# Patient Record
Sex: MALE | Race: WHITE | NOT HISPANIC OR LATINO | Employment: OTHER | URBAN - METROPOLITAN AREA
[De-identification: names, ages, dates, MRNs, and addresses within clinical notes are randomized per-mention and may not be internally consistent; named-entity substitution may affect disease eponyms.]

---

## 2019-01-01 ENCOUNTER — APPOINTMENT (OUTPATIENT)
Dept: RADIOLOGY | Facility: MEDICAL CENTER | Age: 79
DRG: 283 | End: 2019-01-01
Attending: STUDENT IN AN ORGANIZED HEALTH CARE EDUCATION/TRAINING PROGRAM
Payer: MEDICARE

## 2019-01-01 ENCOUNTER — HOSPITAL ENCOUNTER (INPATIENT)
Facility: MEDICAL CENTER | Age: 79
LOS: 1 days | DRG: 283 | End: 2019-01-03
Attending: EMERGENCY MEDICINE | Admitting: INTERNAL MEDICINE
Payer: MEDICARE

## 2019-01-01 VITALS
SYSTOLIC BLOOD PRESSURE: 118 MMHG | HEIGHT: 69 IN | WEIGHT: 180.78 LBS | BODY MASS INDEX: 26.78 KG/M2 | DIASTOLIC BLOOD PRESSURE: 67 MMHG | TEMPERATURE: 99.5 F

## 2019-01-01 DIAGNOSIS — I21.4 NSTEMI (NON-ST ELEVATED MYOCARDIAL INFARCTION) (HCC): ICD-10-CM

## 2019-01-01 DIAGNOSIS — N18.9 ACUTE KIDNEY INJURY SUPERIMPOSED ON CHRONIC KIDNEY DISEASE (HCC): ICD-10-CM

## 2019-01-01 DIAGNOSIS — N17.9 ACUTE KIDNEY INJURY SUPERIMPOSED ON CHRONIC KIDNEY DISEASE (HCC): ICD-10-CM

## 2019-01-01 DIAGNOSIS — D64.9 ANEMIA, UNSPECIFIED TYPE: ICD-10-CM

## 2019-01-01 LAB
ABO GROUP BLD: NORMAL
ABO GROUP BLD: NORMAL
ACTION RANGE TRIGGERED IACRT: YES
ALBUMIN SERPL BCP-MCNC: 3.6 G/DL (ref 3.2–4.9)
ALBUMIN/GLOB SERPL: 1.3 G/DL
ALP SERPL-CCNC: 102 U/L (ref 30–99)
ALT SERPL-CCNC: 24 U/L (ref 2–50)
ANION GAP SERPL CALC-SCNC: 5 MMOL/L (ref 0–11.9)
AST SERPL-CCNC: 33 U/L (ref 12–45)
BARCODED ABORH UBTYP: 6200
BARCODED PRD CODE UBPRD: NORMAL
BARCODED UNIT NUM UBUNT: NORMAL
BASE EXCESS BLDA CALC-SCNC: -2 MMOL/L (ref -4–3)
BASOPHILS # BLD AUTO: 0 % (ref 0–1.8)
BASOPHILS # BLD: 0 K/UL (ref 0–0.12)
BILIRUB SERPL-MCNC: 0.3 MG/DL (ref 0.1–1.5)
BLD GP AB SCN SERPL QL: NORMAL
BNP SERPL-MCNC: 911 PG/ML (ref 0–100)
BODY TEMPERATURE: ABNORMAL DEGREES
BUN SERPL-MCNC: 47 MG/DL (ref 8–22)
CALCIUM SERPL-MCNC: 8.3 MG/DL (ref 8.5–10.5)
CHLORIDE SERPL-SCNC: 105 MMOL/L (ref 96–112)
CO2 BLDA-SCNC: 29 MMOL/L (ref 20–33)
CO2 SERPL-SCNC: 28 MMOL/L (ref 20–33)
COMPONENT R 8504R: NORMAL
CREAT SERPL-MCNC: 3.92 MG/DL (ref 0.5–1.4)
EKG IMPRESSION: NORMAL
EOSINOPHIL # BLD AUTO: 0.2 K/UL (ref 0–0.51)
EOSINOPHIL NFR BLD: 0.9 % (ref 0–6.9)
ERYTHROCYTE [DISTWIDTH] IN BLOOD BY AUTOMATED COUNT: 47.2 FL (ref 35.9–50)
ERYTHROCYTE [DISTWIDTH] IN BLOOD BY AUTOMATED COUNT: 50.8 FL (ref 35.9–50)
FERRITIN SERPL-MCNC: 429.7 NG/ML (ref 22–322)
GGT SERPL-CCNC: 23 U/L (ref 7–51)
GLOBULIN SER CALC-MCNC: 2.7 G/DL (ref 1.9–3.5)
GLUCOSE SERPL-MCNC: 223 MG/DL (ref 65–99)
HCO3 BLDA-SCNC: 27 MMOL/L (ref 17–25)
HCT VFR BLD AUTO: 27.1 % (ref 42–52)
HCT VFR BLD AUTO: 31.4 % (ref 42–52)
HGB BLD-MCNC: 8.3 G/DL (ref 14–18)
HGB BLD-MCNC: 9.5 G/DL (ref 14–18)
HOROWITZ INDEX BLDA+IHG-RTO: 140 MM[HG]
INST. QUALIFIED PATIENT IIQPT: YES
IRON SATN MFR SERPL: 21 % (ref 15–55)
IRON SERPL-MCNC: 69 UG/DL (ref 50–180)
LACTATE BLD-SCNC: 1.1 MMOL/L (ref 0.5–2)
LYMPHOCYTES # BLD AUTO: 1.17 K/UL (ref 1–4.8)
LYMPHOCYTES NFR BLD: 5.2 % (ref 22–41)
MAGNESIUM SERPL-MCNC: 2.2 MG/DL (ref 1.5–2.5)
MANUAL DIFF BLD: NORMAL
MCH RBC QN AUTO: 30.9 PG (ref 27–33)
MCH RBC QN AUTO: 31 PG (ref 27–33)
MCHC RBC AUTO-ENTMCNC: 30.3 G/DL (ref 33.7–35.3)
MCHC RBC AUTO-ENTMCNC: 30.6 G/DL (ref 33.7–35.3)
MCV RBC AUTO: 100.7 FL (ref 81.4–97.8)
MCV RBC AUTO: 102.6 FL (ref 81.4–97.8)
METAMYELOCYTES NFR BLD MANUAL: 4.4 %
MONOCYTES # BLD AUTO: 0.79 K/UL (ref 0–0.85)
MONOCYTES NFR BLD AUTO: 3.5 % (ref 0–13.4)
MORPHOLOGY BLD-IMP: NORMAL
MYELOCYTES NFR BLD MANUAL: 5.3 %
NEUTROPHILS # BLD AUTO: 18.16 K/UL (ref 1.82–7.42)
NEUTROPHILS NFR BLD: 77.2 % (ref 44–72)
NEUTS BAND NFR BLD MANUAL: 3.5 % (ref 0–10)
NRBC # BLD AUTO: 0.42 K/UL
NRBC BLD-RTO: 1.9 /100 WBC
O2/TOTAL GAS SETTING VFR VENT: 60 %
OVALOCYTES BLD QL SMEAR: NORMAL
PCO2 BLDA: 68.3 MMHG (ref 26–37)
PCO2 TEMP ADJ BLDA: 68.9 MMHG (ref 26–37)
PH BLDA: 7.2 [PH] (ref 7.4–7.5)
PH TEMP ADJ BLDA: 7.2 [PH] (ref 7.4–7.5)
PHOSPHATE SERPL-MCNC: 4.6 MG/DL (ref 2.5–4.5)
PLATELET # BLD AUTO: 136 K/UL (ref 164–446)
PLATELET # BLD AUTO: 155 K/UL (ref 164–446)
PLATELET BLD QL SMEAR: NORMAL
PMV BLD AUTO: 10.8 FL (ref 9–12.9)
PMV BLD AUTO: 10.9 FL (ref 9–12.9)
PO2 BLDA: 84 MMHG (ref 64–87)
PO2 TEMP ADJ BLDA: 86 MMHG (ref 64–87)
POIKILOCYTOSIS BLD QL SMEAR: NORMAL
POTASSIUM SERPL-SCNC: 5.4 MMOL/L (ref 3.6–5.5)
PROCALCITONIN SERPL-MCNC: 0.34 NG/ML
PRODUCT TYPE UPROD: NORMAL
PROT SERPL-MCNC: 6.3 G/DL (ref 6–8.2)
RBC # BLD AUTO: 2.69 M/UL (ref 4.7–6.1)
RBC # BLD AUTO: 3.06 M/UL (ref 4.7–6.1)
RBC BLD AUTO: PRESENT
RH BLD: NORMAL
RH BLD: NORMAL
SAO2 % BLDA: 93 % (ref 93–99)
SODIUM SERPL-SCNC: 138 MMOL/L (ref 135–145)
SPECIMEN DRAWN FROM PATIENT: ABNORMAL
TIBC SERPL-MCNC: 326 UG/DL (ref 250–450)
TOXIC GRANULES BLD QL SMEAR: SLIGHT
TROPONIN I SERPL-MCNC: 15.46 NG/ML (ref 0–0.04)
TSH SERPL DL<=0.005 MIU/L-ACNC: 1.48 UIU/ML (ref 0.38–5.33)
UNIT STATUS USTAT: NORMAL
WBC # BLD AUTO: 18.5 K/UL (ref 4.8–10.8)
WBC # BLD AUTO: 22.5 K/UL (ref 4.8–10.8)

## 2019-01-01 PROCEDURE — 93005 ELECTROCARDIOGRAM TRACING: CPT | Performed by: STUDENT IN AN ORGANIZED HEALTH CARE EDUCATION/TRAINING PROGRAM

## 2019-01-01 PROCEDURE — 76700 US EXAM ABDOM COMPLETE: CPT

## 2019-01-01 PROCEDURE — A9270 NON-COVERED ITEM OR SERVICE: HCPCS | Performed by: STUDENT IN AN ORGANIZED HEALTH CARE EDUCATION/TRAINING PROGRAM

## 2019-01-01 PROCEDURE — 30233N1 TRANSFUSION OF NONAUTOLOGOUS RED BLOOD CELLS INTO PERIPHERAL VEIN, PERCUTANEOUS APPROACH: ICD-10-PCS | Performed by: INTERNAL MEDICINE

## 2019-01-01 PROCEDURE — 82728 ASSAY OF FERRITIN: CPT

## 2019-01-01 PROCEDURE — 99291 CRITICAL CARE FIRST HOUR: CPT | Performed by: INTERNAL MEDICINE

## 2019-01-01 PROCEDURE — 36430 TRANSFUSION BLD/BLD COMPNT: CPT

## 2019-01-01 PROCEDURE — 700111 HCHG RX REV CODE 636 W/ 250 OVERRIDE (IP): Performed by: INTERNAL MEDICINE

## 2019-01-01 PROCEDURE — 99233 SBSQ HOSP IP/OBS HIGH 50: CPT | Performed by: INTERNAL MEDICINE

## 2019-01-01 PROCEDURE — 96374 THER/PROPH/DIAG INJ IV PUSH: CPT

## 2019-01-01 PROCEDURE — 85027 COMPLETE CBC AUTOMATED: CPT

## 2019-01-01 PROCEDURE — 84484 ASSAY OF TROPONIN QUANT: CPT

## 2019-01-01 PROCEDURE — 86923 COMPATIBILITY TEST ELECTRIC: CPT

## 2019-01-01 PROCEDURE — 5A09357 ASSISTANCE WITH RESPIRATORY VENTILATION, LESS THAN 24 CONSECUTIVE HOURS, CONTINUOUS POSITIVE AIRWAY PRESSURE: ICD-10-PCS | Performed by: INTERNAL MEDICINE

## 2019-01-01 PROCEDURE — 94002 VENT MGMT INPAT INIT DAY: CPT

## 2019-01-01 PROCEDURE — 82977 ASSAY OF GGT: CPT

## 2019-01-01 PROCEDURE — 99292 CRITICAL CARE ADDL 30 MIN: CPT | Performed by: INTERNAL MEDICINE

## 2019-01-01 PROCEDURE — 83735 ASSAY OF MAGNESIUM: CPT

## 2019-01-01 PROCEDURE — 83036 HEMOGLOBIN GLYCOSYLATED A1C: CPT

## 2019-01-01 PROCEDURE — 93010 ELECTROCARDIOGRAM REPORT: CPT | Mod: 76 | Performed by: INTERNAL MEDICINE

## 2019-01-01 PROCEDURE — 93010 ELECTROCARDIOGRAM REPORT: CPT | Performed by: INTERNAL MEDICINE

## 2019-01-01 PROCEDURE — 82803 BLOOD GASES ANY COMBINATION: CPT

## 2019-01-01 PROCEDURE — 700102 HCHG RX REV CODE 250 W/ 637 OVERRIDE(OP): Performed by: STUDENT IN AN ORGANIZED HEALTH CARE EDUCATION/TRAINING PROGRAM

## 2019-01-01 PROCEDURE — 83540 ASSAY OF IRON: CPT

## 2019-01-01 PROCEDURE — 51798 US URINE CAPACITY MEASURE: CPT

## 2019-01-01 PROCEDURE — 700105 HCHG RX REV CODE 258: Performed by: EMERGENCY MEDICINE

## 2019-01-01 PROCEDURE — 84443 ASSAY THYROID STIM HORMONE: CPT

## 2019-01-01 PROCEDURE — 700105 HCHG RX REV CODE 258: Performed by: STUDENT IN AN ORGANIZED HEALTH CARE EDUCATION/TRAINING PROGRAM

## 2019-01-01 PROCEDURE — 94760 N-INVAS EAR/PLS OXIMETRY 1: CPT

## 2019-01-01 PROCEDURE — 700111 HCHG RX REV CODE 636 W/ 250 OVERRIDE (IP): Performed by: STUDENT IN AN ORGANIZED HEALTH CARE EDUCATION/TRAINING PROGRAM

## 2019-01-01 PROCEDURE — 84100 ASSAY OF PHOSPHORUS: CPT

## 2019-01-01 PROCEDURE — 80053 COMPREHEN METABOLIC PANEL: CPT

## 2019-01-01 PROCEDURE — 86850 RBC ANTIBODY SCREEN: CPT

## 2019-01-01 PROCEDURE — 700111 HCHG RX REV CODE 636 W/ 250 OVERRIDE (IP): Performed by: EMERGENCY MEDICINE

## 2019-01-01 PROCEDURE — 83605 ASSAY OF LACTIC ACID: CPT

## 2019-01-01 PROCEDURE — C9113 INJ PANTOPRAZOLE SODIUM, VIA: HCPCS | Performed by: EMERGENCY MEDICINE

## 2019-01-01 PROCEDURE — 83550 IRON BINDING TEST: CPT

## 2019-01-01 PROCEDURE — C9113 INJ PANTOPRAZOLE SODIUM, VIA: HCPCS | Performed by: INTERNAL MEDICINE

## 2019-01-01 PROCEDURE — 700101 HCHG RX REV CODE 250: Performed by: STUDENT IN AN ORGANIZED HEALTH CARE EDUCATION/TRAINING PROGRAM

## 2019-01-01 PROCEDURE — 93005 ELECTROCARDIOGRAM TRACING: CPT | Performed by: EMERGENCY MEDICINE

## 2019-01-01 PROCEDURE — 86900 BLOOD TYPING SEROLOGIC ABO: CPT

## 2019-01-01 PROCEDURE — P9016 RBC LEUKOCYTES REDUCED: HCPCS

## 2019-01-01 PROCEDURE — 99285 EMERGENCY DEPT VISIT HI MDM: CPT

## 2019-01-01 PROCEDURE — 770020 HCHG ROOM/CARE - TELE (206)

## 2019-01-01 PROCEDURE — 85007 BL SMEAR W/DIFF WBC COUNT: CPT

## 2019-01-01 PROCEDURE — 86901 BLOOD TYPING SEROLOGIC RH(D): CPT

## 2019-01-01 PROCEDURE — 84145 PROCALCITONIN (PCT): CPT

## 2019-01-01 PROCEDURE — 87040 BLOOD CULTURE FOR BACTERIA: CPT

## 2019-01-01 PROCEDURE — 83880 ASSAY OF NATRIURETIC PEPTIDE: CPT

## 2019-01-01 PROCEDURE — 99223 1ST HOSP IP/OBS HIGH 75: CPT | Performed by: INTERNAL MEDICINE

## 2019-01-01 PROCEDURE — 71045 X-RAY EXAM CHEST 1 VIEW: CPT

## 2019-01-01 RX ORDER — ONDANSETRON 2 MG/ML
8 INJECTION INTRAMUSCULAR; INTRAVENOUS EVERY 8 HOURS PRN
Status: DISCONTINUED | OUTPATIENT
Start: 2019-01-01 | End: 2019-01-01 | Stop reason: HOSPADM

## 2019-01-01 RX ORDER — IPRATROPIUM BROMIDE AND ALBUTEROL SULFATE 2.5; .5 MG/3ML; MG/3ML
SOLUTION RESPIRATORY (INHALATION)
Status: DISCONTINUED
Start: 2019-01-01 | End: 2019-01-01

## 2019-01-01 RX ORDER — ATORVASTATIN CALCIUM 10 MG/1
10 TABLET, FILM COATED ORAL EVERY EVENING
Status: DISCONTINUED | OUTPATIENT
Start: 2019-01-01 | End: 2019-01-01

## 2019-01-01 RX ORDER — MORPHINE SULFATE 10 MG/ML
10 INJECTION, SOLUTION INTRAMUSCULAR; INTRAVENOUS
Status: DISCONTINUED | OUTPATIENT
Start: 2019-01-01 | End: 2019-01-01 | Stop reason: HOSPADM

## 2019-01-01 RX ORDER — MORPHINE SULFATE 10 MG/ML
5 INJECTION, SOLUTION INTRAMUSCULAR; INTRAVENOUS
Status: DISCONTINUED | OUTPATIENT
Start: 2019-01-01 | End: 2019-01-01 | Stop reason: HOSPADM

## 2019-01-01 RX ORDER — FUROSEMIDE 10 MG/ML
80 INJECTION INTRAMUSCULAR; INTRAVENOUS ONCE
Status: DISCONTINUED | OUTPATIENT
Start: 2019-01-01 | End: 2019-01-01

## 2019-01-01 RX ORDER — MORPHINE SULFATE 4 MG/ML
1 INJECTION, SOLUTION INTRAMUSCULAR; INTRAVENOUS EVERY 4 HOURS PRN
Status: DISCONTINUED | OUTPATIENT
Start: 2019-01-01 | End: 2019-01-01

## 2019-01-01 RX ORDER — MORPHINE SULFATE 100 MG/5ML
10 SOLUTION ORAL
Status: DISCONTINUED | OUTPATIENT
Start: 2019-01-01 | End: 2019-01-01 | Stop reason: HOSPADM

## 2019-01-01 RX ORDER — MORPHINE SULFATE 100 MG/5ML
10 SOLUTION ORAL
Status: DISCONTINUED | OUTPATIENT
Start: 2019-01-01 | End: 2019-01-01

## 2019-01-01 RX ORDER — ONDANSETRON 4 MG/1
8 TABLET, ORALLY DISINTEGRATING ORAL EVERY 8 HOURS PRN
Status: DISCONTINUED | OUTPATIENT
Start: 2019-01-01 | End: 2019-01-01 | Stop reason: HOSPADM

## 2019-01-01 RX ORDER — AMOXICILLIN 250 MG
2 CAPSULE ORAL 2 TIMES DAILY
Status: DISCONTINUED | OUTPATIENT
Start: 2019-01-01 | End: 2019-01-01

## 2019-01-01 RX ORDER — IPRATROPIUM BROMIDE AND ALBUTEROL SULFATE 2.5; .5 MG/3ML; MG/3ML
3 SOLUTION RESPIRATORY (INHALATION)
Status: DISCONTINUED | OUTPATIENT
Start: 2019-01-01 | End: 2019-01-01

## 2019-01-01 RX ORDER — LORAZEPAM 2 MG/ML
1 CONCENTRATE ORAL
Status: DISCONTINUED | OUTPATIENT
Start: 2019-01-01 | End: 2019-01-01 | Stop reason: HOSPADM

## 2019-01-01 RX ORDER — POLYETHYLENE GLYCOL 3350 17 G/17G
1 POWDER, FOR SOLUTION ORAL
Status: DISCONTINUED | OUTPATIENT
Start: 2019-01-01 | End: 2019-01-01

## 2019-01-01 RX ORDER — LORAZEPAM 2 MG/ML
1 CONCENTRATE ORAL
Status: DISCONTINUED | OUTPATIENT
Start: 2019-01-01 | End: 2019-01-01

## 2019-01-01 RX ORDER — NITROGLYCERIN 20 MG/100ML
5 INJECTION INTRAVENOUS CONTINUOUS
Status: DISCONTINUED | OUTPATIENT
Start: 2019-01-01 | End: 2019-01-01

## 2019-01-01 RX ORDER — PANTOPRAZOLE SODIUM 40 MG/10ML
80 INJECTION, POWDER, LYOPHILIZED, FOR SOLUTION INTRAVENOUS ONCE
Status: COMPLETED | OUTPATIENT
Start: 2019-01-01 | End: 2019-01-01

## 2019-01-01 RX ORDER — PANTOPRAZOLE SODIUM 40 MG/10ML
40 INJECTION, POWDER, LYOPHILIZED, FOR SOLUTION INTRAVENOUS DAILY
Status: DISCONTINUED | OUTPATIENT
Start: 2019-01-01 | End: 2019-01-01

## 2019-01-01 RX ORDER — ATROPINE SULFATE 10 MG/ML
2 SOLUTION/ DROPS OPHTHALMIC EVERY 4 HOURS PRN
Status: DISCONTINUED | OUTPATIENT
Start: 2019-01-01 | End: 2019-01-01 | Stop reason: HOSPADM

## 2019-01-01 RX ORDER — BISACODYL 10 MG
10 SUPPOSITORY, RECTAL RECTAL
Status: DISCONTINUED | OUTPATIENT
Start: 2019-01-01 | End: 2019-01-01

## 2019-01-01 RX ORDER — FUROSEMIDE 10 MG/ML
20 INJECTION INTRAMUSCULAR; INTRAVENOUS
Status: DISCONTINUED | OUTPATIENT
Start: 2019-01-01 | End: 2019-01-01

## 2019-01-01 RX ORDER — LORAZEPAM 2 MG/ML
1 INJECTION INTRAMUSCULAR
Status: DISCONTINUED | OUTPATIENT
Start: 2019-01-01 | End: 2019-01-01 | Stop reason: HOSPADM

## 2019-01-01 RX ORDER — DEXTROSE MONOHYDRATE 25 G/50ML
25 INJECTION, SOLUTION INTRAVENOUS
Status: DISCONTINUED | OUTPATIENT
Start: 2019-01-01 | End: 2019-01-01

## 2019-01-01 RX ORDER — SODIUM CHLORIDE 9 MG/ML
INJECTION, SOLUTION INTRAVENOUS CONTINUOUS
Status: DISCONTINUED | OUTPATIENT
Start: 2019-01-01 | End: 2019-01-01

## 2019-01-01 RX ORDER — DEXTROSE MONOHYDRATE 25 G/50ML
25 INJECTION, SOLUTION INTRAVENOUS ONCE
Status: COMPLETED | OUTPATIENT
Start: 2019-01-01 | End: 2019-01-01

## 2019-01-01 RX ORDER — ROSUVASTATIN CALCIUM 10 MG/1
5 TABLET, COATED ORAL EVERY EVENING
Status: DISCONTINUED | OUTPATIENT
Start: 2019-01-01 | End: 2019-01-01

## 2019-01-01 RX ORDER — MORPHINE SULFATE 10 MG/ML
5 INJECTION, SOLUTION INTRAMUSCULAR; INTRAVENOUS
Status: DISCONTINUED | OUTPATIENT
Start: 2019-01-01 | End: 2019-01-01

## 2019-01-01 RX ORDER — LORAZEPAM 2 MG/ML
1 INJECTION INTRAMUSCULAR
Status: DISCONTINUED | OUTPATIENT
Start: 2019-01-01 | End: 2019-01-01

## 2019-01-01 RX ORDER — MORPHINE SULFATE 10 MG/ML
10 INJECTION, SOLUTION INTRAMUSCULAR; INTRAVENOUS
Status: DISCONTINUED | OUTPATIENT
Start: 2019-01-01 | End: 2019-01-01

## 2019-01-01 RX ORDER — PANTOPRAZOLE SODIUM 40 MG/10ML
40 INJECTION, POWDER, LYOPHILIZED, FOR SOLUTION INTRAVENOUS 2 TIMES DAILY
Status: DISCONTINUED | OUTPATIENT
Start: 2019-01-01 | End: 2019-01-01

## 2019-01-01 RX ORDER — MORPHINE SULFATE 4 MG/ML
1-2 INJECTION, SOLUTION INTRAMUSCULAR; INTRAVENOUS
Status: DISCONTINUED | OUTPATIENT
Start: 2019-01-01 | End: 2019-01-01

## 2019-01-01 RX ADMIN — LORAZEPAM 1 MG: 2 INJECTION INTRAMUSCULAR at 12:49

## 2019-01-01 RX ADMIN — PANTOPRAZOLE SODIUM 40 MG: 40 INJECTION, POWDER, LYOPHILIZED, FOR SOLUTION INTRAVENOUS at 07:28

## 2019-01-01 RX ADMIN — LORAZEPAM 1 MG: 2 INJECTION INTRAMUSCULAR at 17:27

## 2019-01-01 RX ADMIN — MORPHINE SULFATE 5 MG: 10 INJECTION INTRAVENOUS at 10:15

## 2019-01-01 RX ADMIN — PIPERACILLIN AND TAZOBACTAM 3.38 G: 3; .375 INJECTION, POWDER, LYOPHILIZED, FOR SOLUTION INTRAVENOUS; PARENTERAL at 03:50

## 2019-01-01 RX ADMIN — ASPIRIN 81 MG: 81 TABLET, COATED ORAL at 03:27

## 2019-01-01 RX ADMIN — MORPHINE SULFATE 5 MG: 10 INJECTION INTRAVENOUS at 17:27

## 2019-01-01 RX ADMIN — PANTOPRAZOLE SODIUM 80 MG: 40 INJECTION, POWDER, LYOPHILIZED, FOR SOLUTION INTRAVENOUS at 21:24

## 2019-01-01 RX ADMIN — MORPHINE SULFATE 1 MG: 4 INJECTION INTRAVENOUS at 05:27

## 2019-01-01 RX ADMIN — MORPHINE SULFATE 5 MG: 10 INJECTION INTRAVENOUS at 12:49

## 2019-01-01 RX ADMIN — NITROGLYCERIN 50 MCG/MIN: 20 INJECTION INTRAVENOUS at 04:29

## 2019-01-01 RX ADMIN — MORPHINE SULFATE 5 MG: 10 INJECTION INTRAVENOUS at 09:43

## 2019-01-01 RX ADMIN — FUROSEMIDE 20 MG: 10 INJECTION, SOLUTION INTRAMUSCULAR; INTRAVENOUS at 05:53

## 2019-01-01 RX ADMIN — SODIUM CHLORIDE: 9 INJECTION, SOLUTION INTRAVENOUS at 19:45

## 2019-01-01 RX ADMIN — MORPHINE SULFATE 5 MG: 10 INJECTION INTRAVENOUS at 10:59

## 2019-01-01 RX ADMIN — INSULIN HUMAN 10 UNITS: 100 INJECTION, SOLUTION PARENTERAL at 03:40

## 2019-01-01 RX ADMIN — PIPERACILLIN AND TAZOBACTAM 3.38 G: 3; .375 INJECTION, POWDER, LYOPHILIZED, FOR SOLUTION INTRAVENOUS; PARENTERAL at 06:00

## 2019-01-01 RX ADMIN — DEXTROSE MONOHYDRATE 50 ML: 25 INJECTION, SOLUTION INTRAVENOUS at 03:28

## 2019-01-01 RX ADMIN — LORAZEPAM 1 MG: 2 INJECTION INTRAMUSCULAR at 10:58

## 2019-01-01 RX ADMIN — LORAZEPAM 1 MG: 2 INJECTION INTRAMUSCULAR at 10:15

## 2019-01-01 RX ADMIN — LORAZEPAM 1 MG: 2 INJECTION INTRAMUSCULAR at 09:39

## 2019-01-01 ASSESSMENT — COGNITIVE AND FUNCTIONAL STATUS - GENERAL
DRESSING REGULAR LOWER BODY CLOTHING: TOTAL
SUGGESTED CMS G CODE MODIFIER DAILY ACTIVITY: CN
MOVING FROM LYING ON BACK TO SITTING ON SIDE OF FLAT BED: UNABLE
EATING MEALS: TOTAL
MOBILITY SCORE: 6
CLIMB 3 TO 5 STEPS WITH RAILING: TOTAL
DAILY ACTIVITIY SCORE: 6
PERSONAL GROOMING: TOTAL
MOVING TO AND FROM BED TO CHAIR: UNABLE
STANDING UP FROM CHAIR USING ARMS: TOTAL
TOILETING: TOTAL
TURNING FROM BACK TO SIDE WHILE IN FLAT BAD: UNABLE
DRESSING REGULAR UPPER BODY CLOTHING: TOTAL
WALKING IN HOSPITAL ROOM: TOTAL
HELP NEEDED FOR BATHING: TOTAL
SUGGESTED CMS G CODE MODIFIER MOBILITY: CN

## 2019-01-01 ASSESSMENT — ENCOUNTER SYMPTOMS
HALLUCINATIONS: 0
GASTROINTESTINAL NEGATIVE: 1
CHILLS: 0
DOUBLE VISION: 0
SINUS PAIN: 0
NAUSEA: 1
CONSTITUTIONAL NEGATIVE: 1
MYALGIAS: 0
CONSTIPATION: 0
SPEECH CHANGE: 0
SHORTNESS OF BREATH: 0
EYE PAIN: 0
FOCAL WEAKNESS: 0
HEMOPTYSIS: 1
SPUTUM PRODUCTION: 1
POLYDIPSIA: 0
COUGH: 1
SENSORY CHANGE: 0
SORE THROAT: 0
HEARTBURN: 0
NERVOUS/ANXIOUS: 0
BRUISES/BLEEDS EASILY: 0
ABDOMINAL PAIN: 0
WHEEZING: 0
CARDIOVASCULAR NEGATIVE: 1
VOMITING: 1
SHORTNESS OF BREATH: 1
ORTHOPNEA: 0
BLURRED VISION: 0
DIZZINESS: 0
BLOOD IN STOOL: 0
FEVER: 0
EYES NEGATIVE: 1
LOSS OF CONSCIOUSNESS: 0
FLANK PAIN: 0
SEIZURES: 0
FALLS: 0
DIARRHEA: 1
BACK PAIN: 0
NECK PAIN: 0
PALPITATIONS: 0
HEADACHES: 0
WEAKNESS: 0

## 2019-01-01 ASSESSMENT — COPD QUESTIONNAIRES
COPD SCREENING SCORE: 7
DURING THE PAST 4 WEEKS HOW MUCH DID YOU FEEL SHORT OF BREATH: SOME OF THE TIME
HAVE YOU SMOKED AT LEAST 100 CIGARETTES IN YOUR ENTIRE LIFE: YES
DO YOU EVER COUGH UP ANY MUCUS OR PHLEGM?: YES, A FEW DAYS A WEEK OR MONTH

## 2019-01-01 ASSESSMENT — PAIN SCALES - GENERAL
PAINLEVEL_OUTOF10: 0
PAINLEVEL_OUTOF10: 0
PAINLEVEL_OUTOF10: 10
PAINLEVEL_OUTOF10: 0
PAINLEVEL_OUTOF10: 8
PAINLEVEL_OUTOF10: 0
PAINLEVEL_OUTOF10: 0
PAINLEVEL_OUTOF10: 8

## 2019-01-01 ASSESSMENT — PATIENT HEALTH QUESTIONNAIRE - PHQ9
SUM OF ALL RESPONSES TO PHQ9 QUESTIONS 1 AND 2: 0
2. FEELING DOWN, DEPRESSED, IRRITABLE, OR HOPELESS: NOT AT ALL
1. LITTLE INTEREST OR PLEASURE IN DOING THINGS: NOT AT ALL

## 2019-01-01 ASSESSMENT — LIFESTYLE VARIABLES
EVER_SMOKED: YES
EVER_SMOKED: YES
SUBSTANCE_ABUSE: 0
ALCOHOL_USE: NO

## 2019-01-01 ASSESSMENT — PULMONARY FUNCTION TESTS: EPAP_CMH2O: 8

## 2019-01-03 PROBLEM — J96.01 ACUTE RESPIRATORY FAILURE WITH HYPOXIA (HCC): Status: ACTIVE | Noted: 2019-01-01

## 2019-01-03 PROBLEM — K92.2 GI BLEED: Status: ACTIVE | Noted: 2019-01-01

## 2019-01-03 PROBLEM — J81.0 ACUTE PULMONARY EDEMA (HCC): Status: ACTIVE | Noted: 2019-01-01

## 2019-01-03 PROBLEM — R79.89 ELEVATED BRAIN NATRIURETIC PEPTIDE (BNP) LEVEL: Status: ACTIVE | Noted: 2019-01-01

## 2019-01-03 PROBLEM — J44.1 CHRONIC OBSTRUCTIVE PULMONARY DISEASE WITH ACUTE EXACERBATION (HCC): Status: ACTIVE | Noted: 2019-01-01

## 2019-01-03 PROBLEM — N18.9 ACUTE KIDNEY INJURY SUPERIMPOSED ON CHRONIC KIDNEY DISEASE (HCC): Status: ACTIVE | Noted: 2019-01-01

## 2019-01-03 PROBLEM — R74.8 ELEVATED LIVER ENZYMES: Status: ACTIVE | Noted: 2019-01-01

## 2019-01-03 PROBLEM — R14.0 ABDOMINAL DISTENTION: Status: ACTIVE | Noted: 2019-01-01

## 2019-01-03 PROBLEM — I21.4 NSTEMI (NON-ST ELEVATED MYOCARDIAL INFARCTION) (HCC): Status: ACTIVE | Noted: 2019-01-01

## 2019-01-03 PROBLEM — D64.9 ANEMIA: Status: ACTIVE | Noted: 2019-01-01

## 2019-01-03 PROBLEM — R60.0 BILATERAL LOWER EXTREMITY EDEMA: Status: ACTIVE | Noted: 2019-01-01

## 2019-01-03 PROBLEM — N17.9 ACUTE KIDNEY INJURY SUPERIMPOSED ON CHRONIC KIDNEY DISEASE (HCC): Status: ACTIVE | Noted: 2019-01-01

## 2019-01-03 PROBLEM — E87.5 HYPERKALEMIA: Status: ACTIVE | Noted: 2019-01-01

## 2019-01-03 NOTE — ED NOTES
Assist RN: pts wife calling for update, attempted to update wife on pts poc, wife informed that staff will call with updates as soon as possible.

## 2019-01-03 NOTE — ASSESSMENT & PLAN NOTE
Patient Is having ST depressions on EKG in V4, V4, V6 that worsened on repeat EKG hours later. This is likely demand ischemia given that patient has dropped about 2 units over the last week.     Plan:  - Transfused 1U PRBC  - Aspirin, statin, metoprolol per Cardiology.  Metoprolol held secondary to hypotension.  - NTG infusion.  Morphine for chest pain.   - No cardiac intervention at this time per Cardiology.  Not candidate secondary to comorbid states and patient/family preference in goals of care.   - Not candidate for anticoagulation given Hgb drop 3 g in last two weeks.   - Transfuse if Hg < 8 given ischemia.

## 2019-01-03 NOTE — ASSESSMENT & PLAN NOTE
Drop in hemoglobin of 3 g between 12/21/2018 and admit  No clear evidence of acute GI bleeding but that is what is suspected secondary to heme positive stools and worsening anemia  Transfuse to hemoglobin greater than 8 with ischemia  Significant contraindication to heparin therapy per cardiology, I agree  PPI therapy for now  May need GI eval  Monitor closely for GI bleeding clinically  Main 2 peripheral IV access with reasonable caliber    No evidence of GIB active first few hours in ICU

## 2019-01-03 NOTE — PROGRESS NOTES
Pt transferred to T601 from T820 via monitored bed.  Pt c/o 8/10 chest pain.  NTG gtt started upon arrival to [facility] with relief of chest pain.

## 2019-01-03 NOTE — ASSESSMENT & PLAN NOTE
Secondary to above  Avoid hepatotoxins  Serial CMP  Monitor for worsening hepatic function including hypoglycemia, coagulopathy and encephalopathy secondary to elevated ammonia    Bad heart, causing bad liver

## 2019-01-03 NOTE — PROGRESS NOTES
2 RN skin check performed w/ Jasmin RN:    Bilateral ears pink, blanching. Grey foam pads in place.   Otherwise skin intact.

## 2019-01-03 NOTE — ASSESSMENT & PLAN NOTE
Patient  Abdomen benign by my exam  History of diverticulosis?  Ultrasound abdomen pending to evaluate for ascites  Empiric antibiotics started by emergency room physician/admitting hospitalist team secondary to concern of possible intra-abdominal infection, patient apparently has received recent chemotherapy, unknown medication

## 2019-01-03 NOTE — PROGRESS NOTES
Cardiology Follow Up Progress Note    Date of Service  1/3/2019    Attending Physician  Altaf Hamilton M.D.    Chief Complaint   NSTEMI    HPI  Kyle Dubois is a 78 y.o. male admitted 1/2/2019 with chest pain, respiratory failure, hematuria, heme + stool, anemia, HLP, HTN, Tobacco, ROSANNE on CKD 4, prior prostate CA.    FH/SH reviewed/unchanged.      Interim Events  Tele- sinus, no VT  BiPAP now, given lasix  Recurrent CP, on nitro gtt  Cr 4.5 ->3.92  Hgb 9 after transfusion  Trop 11->15  Wife at the bedside, patient sleeping    Review of Systems  Review of Systems   Unable to perform ROS: Acuity of condition       Vital signs in last 24 hours  Temp:  [36.7 °C (98 °F)-37.4 °C (99.3 °F)] 37.2 °C (99 °F)  Pulse:  [65-96] 80  Resp:  [15-28] 22  BP: ()/(50-67) 118/67    Physical Exam  Physical Exam     General: No acute distress.  Chronically ill-appearing  HEENT: EOM grossly intact, no scleral icterus, no pharyngeal erythema.  BiPAP in place  Neck:  No JVD appreciated, no bruits, trachea midline  CVS: RRR. Normal S1, S2. No brent M/R/G. + LE edema.  2+ radial pulses, 2+ PT pulses  Resp: Coarse breath sounds throughout bilaterally, poor respiratory effort, mild increased work of breathing  Abdomen: Soft, NT, no brent hepatomegaly.  MSK/Ext: No clubbing or cyanosis.  Skin: Warm and dry, no rashes.  Neurological: CN III-XII grossly intact.  Generally weak  Psych: Sleeping, arousable      Lab Review  Lab Results   Component Value Date/Time    WBC 22.5 (H) 01/03/2019 03:51 AM    RBC 3.06 (L) 01/03/2019 03:51 AM    HEMOGLOBIN 9.5 (L) 01/03/2019 03:51 AM    HEMATOCRIT 31.4 (L) 01/03/2019 03:51 AM    .6 (H) 01/03/2019 03:51 AM    MCH 31.0 01/03/2019 03:51 AM    MCHC 30.3 (L) 01/03/2019 03:51 AM    MPV 10.8 01/03/2019 03:51 AM      Lab Results   Component Value Date/Time    SODIUM 138 01/03/2019 03:51 AM    POTASSIUM 5.4 01/03/2019 03:51 AM    CHLORIDE 105 01/03/2019 03:51 AM    CO2 28 01/03/2019 03:51 AM     GLUCOSE 223 (H) 01/03/2019 03:51 AM    BUN 47 (H) 01/03/2019 03:51 AM    CREATININE 3.92 (H) 01/03/2019 03:51 AM      Lab Results   Component Value Date/Time    ASTSGOT 33 01/03/2019 03:51 AM    ALTSGPT 24 01/03/2019 03:51 AM     Lab Results   Component Value Date/Time    CHOLSTRLTOT 283 (H) 02/23/2018 11:15 AM     (H) 02/23/2018 11:15 AM    HDL 57.0 02/23/2018 11:15 AM    TRIGLYCERIDE 120 02/23/2018 11:15 AM    TROPONINI 15.46 (H) 01/03/2019 03:51 AM       Recent Labs      01/02/19   1500  01/03/19   0351   BNPBTYPENAT  1150*  911*       Cardiac Imaging and Procedures Review  EKG:  My personal interpretation of the EKG dated 1-3-18 is sinus, 83, inferolateral ST depression, probably ischemia     Echocardiogram:  pending      Imaging  Chest X-Ray:  1-3-18  Interstitial opacification is worrisome for edema. Underlying scarring is also possible      Assessment/Plan  -NSTEMI, ongoing ischemia and CP  -Acute hypoxic resp failure, likely due to CHF  -Acute CHF, probably systolic  -Anemia, better after transfusion  -Ongoing blood loss, stool vs bladder  -ROSANNE on CKD 4  -BP normal this morning    PLAN:  -Supportive care, no plans for revascularization  -Agree with nitro gtt and lasix  -I would support comfort care  -awaiting echo    DW Dr. Gaffney    Thank you for allowing me to participate in the care of this patient.  I will continue to follow this patient    Please contact me with any questions.    Beena Morris M.D.   Cardiologist, [facility]  [phone]

## 2019-01-03 NOTE — PROGRESS NOTES
Patient up from ED, report received. Patient placed on tele monitor, monitor room notified. Pt SR 79. Pt is running 1 unit of blood, no signs of adverse reaction. Physical assessment performed. Patient is A&O X 4, declines pain at this time.  Patient oriented to room and unit routine. Patient educated on plan for the day. Bed alarm in place and educated on use of call light.

## 2019-01-03 NOTE — PROGRESS NOTES
Pt EKG came back showing what appears to be worsening ST depression. Pt complaining of mild SOB, same symptoms that brought him into ER. [facility] resident Dr Coffey updated, no new orders at this time. Will continue to monitor.

## 2019-01-03 NOTE — ASSESSMENT & PLAN NOTE
Chest x-ray consistent with worsening pulmonary edema  Clinical history suggestive of evolving myocardial infarction  Forced diuresis  IV morphine  Fluid restriction  Elevation of patient head of bed greater than 30-45 degrees  IV nitroglycerin  Optimize electrolytes    More lasix given plus morpine

## 2019-01-03 NOTE — ASSESSMENT & PLAN NOTE
Monitor for hypotension  Would use nitro and morphine for CHF and hope for better control hypertension  Additional therapy with beta-blockers as clinically tolerated    Bps holding for now

## 2019-01-03 NOTE — ASSESSMENT & PLAN NOTE
Suspect related to pulmonary edema (BNP elevated/chest x-ray consistent with pulmonary edema) on top of COPD.  Less likely COPD exacerbation but patient does have bronchospasm although I suspect is cardiac asthma  Doubt pneumonic process  DuoNeb every 4 hours and every 2 hours as needed  AVAPS empirically, (auto BiPAP)  Initial settings: Min EPAP 8, min pressure support 10, backup rate 12, tidal volume 7 cc/kg  Full facemask  Titrate O2 to greater than 92%  Forced diuresis  Hold on steroids at this time  High flow nasal cannula if he cannot tolerate BiPAP    DNR/DNI per pt.wife. Only modest benefit from BiPap

## 2019-01-03 NOTE — PROGRESS NOTES
Critical Care Progress Note    Date of admission  1/2/2019    Chief Complaint  78 y.o. male admitt with SOB    Hospital Course  Admitted via the ED to the ICU with acute shortness of breath and felt to have a NSEMI    Interval Problem Update  Reviewed last 24 hour events:  Pt admitted with EKG changes and elevated trop  Acute on chronic renal failure  HTN treating  Nitro drip  BiPap titration    Discussed goals of care with pt/wife. Per their request goals of care changed to comfort measures only. Death appears immenent      Review of Systems  Review of Systems   Unable to perform ROS: Critical illness        Vital Signs for last 24 hours   Temp:  [36.7 °C (98 °F)-37.5 °C (99.5 °F)] 37.5 °C (99.5 °F)  Pulse:  [65-96] 85  Resp:  [15-28] 28  BP: ()/(50-67) 118/67    Hemodynamic parameters for last 24 hours       Respiratory       Physical Exam   Physical Exam   Constitutional: He appears distressed.   Ill appearing, in respiratory distress, confused, lethargic   Eyes: Pupils are equal, round, and reactive to light. Conjunctivae are normal.   Neck: Neck supple.   Cardiovascular: Normal rate and regular rhythm.    Pulmonary/Chest: He has rales.   Abdominal: Soft. Bowel sounds are normal.   Neurological:   Lethargic, mumbles, knows own name   Skin: He is diaphoretic.   Skin damp       Medications  Current Facility-Administered Medications   Medication Dose Route Frequency Provider Last Rate Last Dose   • MD ALERT...adult comfort care   Other PRN Gabriel Gaffney M.D.       • atropine 1 % ophthalmic solution 2 Drop  2 Drop Sublingual Q4HRS PRN Gabriel Gaffney M.D.       • ondansetron (ZOFRAN ODT) dispertab 8 mg  8 mg Oral Q8HRS PRN Gabriel Gaffney M.D.        Or   • ondansetron (ZOFRAN) syringe/vial injection 8 mg  8 mg Intravenous Q8HRS PRN Gabriel Gaffney M.D.       • LORazepam (ATIVAN) 2 MG/ML oral conc 1 mg  1 mg Sublingual Q15 MIN PRN Gabriel Gaffney M.D.        Or   • LORazepam (ATIVAN) injection 1 mg   1 mg Intravenous Q15 MIN PRN Gabriel Gaffney M.D.   1 mg at 01/03/19 1058   • morphine (pf) 10 mg/mL injection 5 mg  5 mg Intravenous Q15 MIN PRN Gabriel Gaffney M.D.   5 mg at 01/03/19 1059    Or   • morphine (ROXANOL) 20 MG/ML oral conc 10 mg  10 mg Oral Q15 MIN PRN Gabriel Gaffney M.D.        Or   • morphine (pf) 10 mg/mL injection 10 mg  10 mg Intravenous Q15 MIN PRN Gabriel Gaffney M.D.           Fluids    Intake/Output Summary (Last 24 hours) at 01/03/19 1123  Last data filed at 01/03/19 0800   Gross per 24 hour   Intake           779.23 ml   Output              250 ml   Net           529.23 ml       Laboratory  Recent Labs      01/03/19   0733   ISTATAPH  7.204*   ISTATAPCO2  68.3*   ISTATAPO2  84   ISTATATCO2  29   [id number]  93   ISTATARTHCO3  27.0*   ISTATARTBE  -2   ISTATTEMP  37.2 C   ISTATFIO2  60   ISTATSPEC  Arterial   ISTATAPHTC  7.202*   [id number]  86     Recent Labs      01/02/19   1500  01/03/19   0351   TROPONINI  11.00*  15.46*   BNPBTYPENAT  1150*  911*     Recent Labs      01/02/19   1500  01/03/19   0351   SODIUM  140  138   POTASSIUM  5.4*  5.4   CHLORIDE  103  105   CO2  28  28   BUN  51*  47*   CREATININE  4.5*  3.92*   MAGNESIUM  2.2  2.2   PHOSPHORUS   --   4.6*   CALCIUM  9.0  8.3*     Recent Labs      01/02/19   1500  01/03/19   0351   ALTSGPT  30  24   ASTSGOT  54*  33   ALKPHOSPHAT  118*  102*   TBILIRUBIN  0.2  0.3   GAMMAGT   --   23   LIPASE  273   --    GLUCOSE  128*  223*     Recent Labs      01/02/19   1500  01/02/19   2152  01/03/19   0351   WBC  18.5*  18.5*  22.5*   NEUTSPOLYS  73*   --   77.20*   LYMPHOCYTES  9.0*   --   5.20*   MONOCYTES  7.0   --   3.50   EOSINOPHILS   --    --   0.90   BASOPHILS   --    --   0.00   ASTSGOT  54*   --   33   ALTSGPT  30   --   24   ALKPHOSPHAT  118*   --   102*   TBILIRUBIN  0.2   --   0.3     Recent Labs      01/02/19   1500  01/02/19   2152  01/03/19   0351   RBC  2.80*  2.69*  3.06*   HEMOGLOBIN  8.7*  8.3*  9.5*    HEMATOCRIT  27.0*  27.1*  31.4*   PLATELETCT  155  136*  155*   IRON   --    --   69   FERRITIN   --    --   429.7*   TOTIRONBC   --    --   326       Imaging  X-Ray:  I have personally reviewed the images and compared with prior images.    Assessment/Plan  * NSTEMI (non-ST elevated myocardial infarction) ([facility])   Assessment & Plan    Transfer to [facility]  Serial troponin I  Follow-up echocardiogram  Nitroglycerin infusion  Aspirin therapy  Morphine for chest pain  No cardiac intervention per cardiology at this time  Not a candidate for anticoagulation with hemoglobin drop of 3 g in the last couple weeks  Transfuse to hemoglobin greater than 8 with ischemia  Reviewed with cardiology    Not a candidate for intervention with multiple cormorbid states and because of family/pt preference  Continue ASA     Acute pulmonary edema ([facility])   Assessment & Plan    Chest x-ray consistent with worsening pulmonary edema  Clinical history suggestive of evolving myocardial infarction  Forced diuresis  IV morphine  Fluid restriction  Elevation of patient head of bed greater than 30-45 degrees  IV nitroglycerin  Optimize electrolytes    More lasix given plus morpine     Acute respiratory failure with hypoxia ([facility])   Assessment & Plan    Suspect related to pulmonary edema (BNP elevated/chest x-ray consistent with pulmonary edema) on top of COPD.  Less likely COPD exacerbation but patient does have bronchospasm although I suspect is cardiac asthma  Doubt pneumonic process  DuoNeb every 4 hours and every 2 hours as needed  AVAPS empirically, (auto BiPAP)  Initial settings: Min EPAP 8, min pressure support 10, backup rate 12, tidal volume 7 cc/kg  Full facemask  Titrate O2 to greater than 92%  Forced diuresis  Hold on steroids at this time  High flow nasal cannula if he cannot tolerate BiPAP    DNR/DNI per pt.wife. Only modest benefit from BiPap       Abdominal distention- (present on admission)   Assessment & Plan    Patient  Abdomen benign  by my exam  History of diverticulosis?  Ultrasound abdomen pending to evaluate for ascites  Empiric antibiotics started by emergency room physician/admitting hospitalist team secondary to concern of possible intra-abdominal infection, patient apparently has received recent chemotherapy, unknown medication     Chronic obstructive pulmonary disease with acute exacerbation (HCC)   Assessment & Plan    Known COPD  Ongoing tobacco abuse  RT protocols  If nebulizer trial successful consider IV steroids  Suspect cardiac asthma and CHF from myocardial infarction more likely  Underlying chronic lung disease clearly contributing though to respiratory failure, suspect significant hypercarbia  Trial AVAPS auto BiPAP     Anemia   Assessment & Plan    Drop in hemoglobin of 3 g between 12/21/2018 and admit  No clear evidence of acute GI bleeding but that is what is suspected secondary to heme positive stools and worsening anemia  Transfuse to hemoglobin greater than 8 with ischemia  Significant contraindication to heparin therapy per cardiology, I agree  PPI therapy for now  May need GI eval  Monitor closely for GI bleeding clinically  Main 2 peripheral IV access with reasonable caliber    No evidence of GIB active first few hours in ICU     Acute kidney injury superimposed on chronic kidney disease (HCC)   Assessment & Plan    Presumably secondary to hypoperfusion state and acute myocardial infarction  Avoid nephrotoxins  Renally dose adjust medications as clinically appropriate  Monitor urine output, Place Barger catheter  Serial BMP, monitor renal function  Monitor for need for nephrology consultation and RRT  Patient is extremely poor candidate short-term as well as probably long-term for hemodialysis    Monitor     HTN (hypertension)- (present on admission)   Assessment & Plan    Monitor for hypotension  Would use nitro and morphine for CHF and hope for better control hypertension  Additional therapy with beta-blockers as  clinically tolerated    Bps holding for now     Elevated liver enzymes   Assessment & Plan    Secondary to above  Avoid hepatotoxins  Serial CMP  Monitor for worsening hepatic function including hypoglycemia, coagulopathy and encephalopathy secondary to elevated ammonia    Bad heart, causing bad liver     Hyperkalemia   Assessment & Plan    Mild at this time  Presumed secondary to above  Monitor for the need for pharmacological therapy  Serial BMP    No response to Lasix 20 mg given by night intensivist. I gae 80 mg IV in the hope that this will hell respiratory distress- pulmonary edema          VTE:  Contraindicated  Ulcer: Not Indicated  Lines: None    I have performed a physical exam and reviewed and updated ROS and Plan today (1/3/2019). In review of yesterday's note (1/2/2019), there are no changes except as documented above.     Discussed patient condition and risk of morbidity and/or mortality with Hospitalist, Family, RN, RT, Charge nurse / hot rounds and Patient  The patient remains critically ill.  Critical care time = 35 minutes in directly providing and coordinating critical care and extensive data review.  No time overlap and excludes procedures.

## 2019-01-03 NOTE — ED NOTES
Pt tolerating RBC's infusion well, there is currently no evidence of a transfusion reaction. Pt resting in [facility], needs met, educated to notify staff immediately with any concerns. Pt verbalizes understanding. Primary RN Damian notified of pt condition at this time.

## 2019-01-03 NOTE — ASSESSMENT & PLAN NOTE
Mild at this time  Presumed secondary to above  Monitor for the need for pharmacological therapy  Serial BMP    No response to Lasix 20 mg given by night intensivist. I gae 80 mg IV in the hope that this will hell respiratory distress- pulmonary edema

## 2019-01-03 NOTE — ASSESSMENT & PLAN NOTE
Patient's creatinine has increased to 4.55 from a baseline of around 2.9-3. The patient stated that he had one kidney taken out due to metastatic malignancy. ROSANNE is likely secondary to hypoperfusion given recent NSTEMI.   - Gently hydrated with IVF fluids 50cc/hr prior to change to comfort care measures.
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
## 2019-01-03 NOTE — ASSESSMENT & PLAN NOTE
Patient states that his abdomen is usually big but in recent days it has been larger. His albumin is low at 3.1 Patient also states that his chemotherapy is given via a needle into his abdomen for 7 days. It's unclear whether this is peritoneal chemotherapy.   - Abd ultrasound revealing for trace ascites.  No abd pain/tenderness/rebound/guarding.    - Comfort measures.  Zosyn that was started in ED discontinued.

## 2019-01-03 NOTE — CARE PLAN
Problem: Communication  Goal: The ability to communicate needs accurately and effectively will improve  Outcome: PROGRESSING AS EXPECTED  Pt whiteboard updated on plan of care  Pt encouraged to call for assistance  Pt encouraged to voice any concerns or questions regarding care plan  Pt updated on plan of care as it develops and changes    Problem: Safety  Goal: Will remain free from injury  Outcome: PROGRESSING AS EXPECTED  Treaded socks in use  Call light within reach and patient calls appropriately  Medication education provided prior to administration  Medications administered as ordered  Bed locked in lowest position

## 2019-01-03 NOTE — H&P
Internal Medicine Admitting History and Physical    Note Author: Irene Coffey M.D.       Name Kyle Dubois     1940   Age/Sex 78 y.o. male   MRN [id number]   Code Status FULL     After 5PM or if no immediate response to page, please call for cross-coverage  Attending/Team: Dr. Fang/ ALICJA Delcid See Patient List for primary contact information  Call [phone] to page    1st Call - Day Intern (R1):   Dr. Aldridge 2nd Call - Day Sr. Resident (R2/R3):   Dr. García       Chief Complaint:  Chest tightness, diarrhea    HPI:  Patient is a 78 year old male who was a transfer from [facility] for cardiology and NSTEMI management. The patient states that over the past day, he had felt shortness of breath and some chest tightness. He had no chest pain. This happened all of a sudden. He is normally on 2L of oxygen at home. When he increased it to 4L, he felt much better. The patient has shortness of breath on exertion at baseline. He can walk about 400 ft before becoming short of breath. He stated that the shortness of breath on exertion and chest tightness changed on exertion when he was using 2L of oxygen. However, when he increased it to 4L he felt better. The patient also stated that 2 days ago he coughed up bloody sputum. He stated that he has a chronic cough. However, 2 days ago he saw a streak of blood in silver sputum. That has not happened since. He also had multiple episodes of watery diarrhea as well. He was still having diarrhea in the ED, but only 1 episode.     The patient has a history of prostate cancer 10 years ago. He was recently diagnosed with metastasis. He is actively getting chemotherapy treatment with his last chemo approximately 1 week ago. He was Guiac positive.     In the ED, the patient's EKG showed ST depressions in V4, V5, and V6. His troponin was 11. Cardiology was consulted and given his history of active malignancy and worsening CKD vs ROSANNE on CKD (GFR of 13) and possible GI bleed with  "anemia he is not a candidate for invasive evaluation. They recommended starting him on baby aspirin, lipitor, and metoprolol.     When the patient was transferred to the floors, he began getting transfusion for possible demand ischemia. A repeat EKG showed worsening of ST depressions in V4, V5, and V6. It also showed new ST depressions in V3. Given these new changes and likely worsening of ischemia, Cardiology wanted to start him on a nitroglycerin drip. After speaking to nursing staff, given that the patient's blood pressure has been on the lower side, they were hesitant that his blood pressure would drop further with a nitro drip even with a fixed rate. The patient was transferred to the ICU for further management of ischemia and nitro drip.     Had two separate discussions with the patient about code status. During the first discussion, the patient wanted to be full code and try to do what we can to bring him back. He stated that his wife is his POA. He also said that if he was hooked on machines and would be \"brain dead\" he would want to withdraw care. I had another discussion with him after his EKG changes were worsening. I informed him that given that he has active cancer and is receiving treatment and now is having issues with his heart, there is a very likely chance that he would not survive the chest compressions and intubation or that he may survive the chest compressions and intubation, but he would not walk out of the hospital as he came in. The patient wanted more time to think about it.     Review of Systems   Constitutional: Negative for chills and fever.   HENT: Positive for congestion. Negative for sinus pain and sore throat.         Chronic nasal drip for the year.    Eyes: Negative for blurred vision and double vision.   Respiratory: Positive for cough, hemoptysis, sputum production and shortness of breath. Negative for wheezing.    Cardiovascular: Positive for leg swelling. Negative for chest " pain, palpitations and orthopnea.        Chest tightness   Gastrointestinal: Positive for diarrhea, nausea and vomiting. Negative for abdominal pain, blood in stool, constipation, heartburn and melena.   Genitourinary: Negative for dysuria, frequency and urgency.   Musculoskeletal: Negative for falls and myalgias.   Skin: Negative for itching and rash.   Neurological: Negative for dizziness, sensory change, speech change, loss of consciousness and headaches.   Endo/Heme/Allergies: Negative for environmental allergies and polydipsia.   Psychiatric/Behavioral: Negative for substance abuse. The patient is not nervous/anxious.        Past Medical History:   Past Medical History:   Diagnosis Date   • Chronic pain     legs   • Hematuria    • History of prostate cancer    • Hypertension    • Mixed dyslipidemia    • Nicotine dependence    • Proteinuria     followed by nephrology   • Renal disorder     protein in urine   • Strep throat    • Vitamin D deficiency        Past Surgical History:  Past Surgical History:   Procedure Laterality Date   • COLONOSCOPY - ENDO N/A 4/6/2016    Procedure: COLONOSCOPY - ENDO;  Surgeon: Caesar Hernandez M.D.;  Location: SURGERY [facility];  Service:    • GASTROSCOPY-ENDO N/A 4/6/2016    Procedure: GASTROSCOPY-ENDO;  Surgeon: Caesar Hernandez M.D.;  Location: SURGERY [facility];  Service:    • CATARACT EXTRACTION WITH IOL  01/2011   • PROSTATECTOMY, RADICAL RETRO  2000   • HERNIA REPAIR  1965    inguinal   • OTHER      epidural  recent       Current Outpatient Medications:  Home Medications     Reviewed by Leandra Bauer RJasminNJasmin (Registered Nurse) on 01/03/19 at 0207  Med List Status: Complete   Medication Last Dose Status   aspirin EC (ECOTRIN) 81 MG TBEC 1/2/2019 Active   furosemide (LASIX) 40 MG Tab 1/2/2019 Active   lisinopril (PRINIVIL, ZESTRIL) 40 MG tablet 1/2/2019 Active   patiromer (VELTASSA) 16.8 g Pack 12/28/2018 Active   pentosan (ELMIRON) 100 MG Cap 1/2/2019  Active   pramipexole (MIRAPEX) 0.25 MG Tab 1/2/2019 Active   vitamin D (CHOLECALCIFEROL) 1000 UNIT Tab 1/2/2019 Active                Medication Allergy/Sensitivities:  Allergies   Allergen Reactions   • Statins [Hmg-Coa-R Inhibitors] Unspecified     Lightheadedness   • Influenza Vaccine Live      Family History:  Family History   Problem Relation Age of Onset   • Heart Disease Mother    • Other Brother         chirrosis of liver     Social History:  Social History     Social History   • Marital status:      Spouse name: N/A   • Number of children: N/A   • Years of education: N/A     Occupational History   • Not on file.     Social History Main Topics   • Smoking status: Former Smoker     Packs/day: 0.50     Years: 50.00     Types: Cigarettes   • Smokeless tobacco: Never Used   • Alcohol use No   • Drug use: No   • Sexual activity: Not on file     Other Topics Concern   • Not on file     Social History Narrative   • No narrative on file     Living situation: Lives in [place] with his wife.   PCP : Fabien Faye D.O.      Physical Exam     Vitals:    01/02/19 2345 01/03/19 0040 01/03/19 0045 01/03/19 0219   BP:  110/66 104/59    Pulse: 75 65 76 83   Resp: 18 18 16 15   Temp:  36.8 °C (98.3 °F) 37 °C (98.6 °F)    TempSrc:  Temporal Temporal    SpO2: 95% 98% 96% 95%   Weight:  77.2 kg (170 lb 3.1 oz)       Body mass index is 25.88 kg/m².  /59   Pulse 83   Temp 37 °C (98.6 °F) (Temporal)   Resp 15   Wt 77.2 kg (170 lb 3.1 oz)   SpO2 95%   BMI 25.88 kg/m²   O2 therapy: Pulse Oximetry: 95 %, O2 (LPM): 3, O2 Delivery: Nasal Cannula    Physical Exam   Constitutional: He is oriented to person, place, and time and well-developed, well-nourished, and in no distress. No distress.   HENT:   Head: Normocephalic and atraumatic.   Eyes: Pupils are equal, round, and reactive to light. Conjunctivae and EOM are normal.   Cardiovascular: Normal rate, regular rhythm and normal heart sounds.    Pulmonary/Chest:  Effort normal. No respiratory distress. He has no wheezes.   Decreased breath sounds at the bases.    Abdominal: Soft. Bowel sounds are normal. He exhibits distension. There is no tenderness. There is no rebound and no guarding.   Patient stated that he has been more distended than usual. His chemotherapy is given in his abdomen. It is unclear if it is given in the bladder or intraperitoneal.    Genitourinary: Rectal exam shows guaiac positive stool.   Musculoskeletal: He exhibits edema.   1+ pitting edema to shins bilaterally.    Neurological: He is alert and oriented to person, place, and time. GCS score is 15.   Skin: Skin is warm and dry. No rash noted. He is not diaphoretic. No erythema.     Data Review     Lab Data Review:  Recent Results (from the past 24 hour(s))   LIPASE    Collection Time: 01/02/19  3:00 PM   Result Value Ref Range    Lipase 273 73 - 393 U/L   LACTIC ACID    Collection Time: 01/02/19  3:00 PM   Result Value Ref Range    Lactic Acid 1.7 0.4 - 2.0 mmol/L   COMP METABOLIC PANEL    Collection Time: 01/02/19  3:00 PM   Result Value Ref Range    Sodium 140 136 - 145 mmol/L    Potassium 5.4 (H) 3.5 - 5.1 mmol/L    Chloride 103 98 - 107 mmol/L    Co2 28 21 - 32 mmol/L    Anion Gap 14 10 - 18 mmol/L    Glucose 128 (H) 74 - 99 mg/dL    Bun 51 (H) 7 - 18 mg/dL    Creatinine 4.5 (HH) 0.8 - 1.3 mg/dL    Calcium 9.0 8.5 - 11.0 mg/dL    AST(SGOT) 54 (H) 15 - 37 U/L    ALT(SGPT) 30 12 - 78 U/L    Alkaline Phosphatase 118 (H) 46 - 116 U/L    Total Bilirubin 0.2 0.2 - 1.0 mg/dL    Albumin 3.1 (L) 3.4 - 5.0 g/dL    Total Protein 6.7 6.4 - 8.2 g/dL    A-G Ratio 0.9    CBC WITH DIFFERENTIAL    Collection Time: 01/02/19  3:00 PM   Result Value Ref Range    WBC 18.5 (H) 4.8 - 10.8 K/uL    RBC 2.80 (L) 4.70 - 6.10 M/uL    Hemoglobin 8.7 (L) 14.0 - 18.0 g/dL    Hematocrit 27.0 (L) 42.0 - 52.0 %    MCV 96.4 (H) 80.0 - 94.0 fL    MCH 31.1 (H) 27.0 - 31.0 pg    MCHC 32.2 (L) 33.0 - 37.0 g/dL    RDW 12.9 11.5 - 14.5 %     Platelet Count 155 130 - 400 K/uL    MPV 11.1 (H) 7.4 - 10.4 fL    Neutrophils Automated 60.4 39.0 - 70.0 %    Lymphocytes Automated 10.9 (L) 21.0 - 50.0 %    Monocytes Automated 7.9 2.0 - 9.0 %    Eosinophils Automated 0.0 0.0 - 5.0 %    Basophils Automated 0.3 0.0 - 3.0 %    Abs Neutrophils Automated 11.2 (H) 1.8 - 7.7 K/uL    Abs Lymph Automated 2.0 1.2 - 4.8 K/uL    Eosinophil Count, Blood 0.00 0.00 - 0.50 K/uL    Neutrophils-Polys 73 (H) 39 - 70 %    Lymphocytes 9.0 (L) 21.0 - 50.0 %    Monocytes 7.0 2.0 - 9.0 %    Nucleated RBC 1 /100 WBC    Neutrophils (Absolute) 13.9 (H) 1.8 - 7.7 K/uL   TROPONIN    Collection Time: 19  3:00 PM   Result Value Ref Range    Troponin I 11.00 (HH) 0.00 - 0.06 ng/mL   BTYPE NATRIURETIC PEPTIDE    Collection Time: 19  3:00 PM   Result Value Ref Range    B Natriuretic Peptide 1150 (H) 5 - 179 pg/mL   MAGNESIUM    Collection Time: 19  3:00 PM   Result Value Ref Range    Magnesium 2.2 1.8 - 2.4 mg/dL   ESTIMATED GFR    Collection Time: 19  3:00 PM   Result Value Ref Range    GFR If  15 (A) >60 mL/min/1.73 m 2    GFR If Non  13 (A) >60 mL/min/1.73 m 2   DIFFERENTIAL MANUAL    Collection Time: 19  3:00 PM   Result Value Ref Range    Bands-Stabs 9.0 (H) 0.0 - 6.0 %    Metamyelocytes 1 %    Myelocytes 1 %   INFLUENZA RAPID    Collection Time: 19  3:10 PM   Result Value Ref Range    Influenza A Ag Negative Negative    Influenza B Ag Negative Negative   EKG    Collection Time: 19  7:00 PM   Result Value Ref Range    Report       [facility] Emergency Dept.    Test Date:  2019  Pt Name:    NIURKA HERNANDEZ                   Department: ER  MRN:        [id number]                      Room:        36  Gender:     Male                         Technician: [id number]  :        1940                   Requested By:ER TRIAGE PROTOCOL  Order #:    [id number]                    Reading  MD:    Measurements  Intervals                                Axis  Rate:       71                           P:          74  NV:         168                          QRS:        54  QRSD:       92                           T:          106  QT:         380  QTc:        413    Interpretive Statements  SINUS RHYTHM  REPOL ABNRM SUGGESTS ISCHEMIA, DIFFUSE LEADS  No previous ECG available for comparison     COD - Adult (Type and Screen)    Collection Time: 19  7:35 PM   Result Value Ref Range    ABO Grouping Only A     Rh Grouping Only POS     Antibody Screen-Cod NEG     Component R       R3                  Red Blood Cells3    [id number]   transfused   19   23:07      Product Type Red Blood Cells LR Pheresis     Dispense Status Transfused     Unit Number (Barcoded) [id number]     Product Code (Barcoded) U2534I89     Blood Type (Barcoded) 6200    ABO AND RH CONFIRMATION    Collection Time: 19  8:15 PM   Result Value Ref Range    ABO Confirm A     Second Rh Group POS    CBC WITHOUT DIFFERENTIAL    Collection Time: 19  9:52 PM   Result Value Ref Range    WBC 18.5 (H) 4.8 - 10.8 K/uL    RBC 2.69 (L) 4.70 - 6.10 M/uL    Hemoglobin 8.3 (L) 14.0 - 18.0 g/dL    Hematocrit 27.1 (L) 42.0 - 52.0 %    .7 (H) 81.4 - 97.8 fL    MCH 30.9 27.0 - 33.0 pg    MCHC 30.6 (L) 33.7 - 35.3 g/dL    RDW 47.2 35.9 - 50.0 fL    Platelet Count 136 (L) 164 - 446 K/uL    MPV 10.9 9.0 - 12.9 fL   EKG    Collection Time: 19  2:11 AM   Result Value Ref Range    Report       [facility]    Test Date:  2019  Pt Name:    NIURKA HERNANDEZ                   Department: ER  MRN:        [id number]                      Room:       T820  Gender:     Male                         Technician: CHRISTIN  :        1940                   Requested By:SHERRY HARDY  Order #:    [id number]                    Reading MD:    Measurements  Intervals                                Axis  Rate:       90                            P:          80  WV:         160                          QRS:        51  QRSD:       90                           T:          125  QT:         352  QTc:        431    Interpretive Statements  SINUS RHYTHM  REPOL ABNRM SUGGESTS ISCHEMIA, DIFFUSE LEADS  BASELINE WANDER IN LEAD(S) II,III,aVF  Compared to ECG 01/02/2019 19:00:02  No significant changes         Imaging/Procedures Review:    Independant Imaging Review: Completed  EC-ECHOCARDIOGRAM COMPLETE W/O CONT    (Results Pending)   US-ABDOMEN COMPLETE SURVEY    (Results Pending)             Assessment/Plan     * NSTEMI (non-ST elevated myocardial infarction) (HCC)   Assessment & Plan    Patient Is having ST depressions on EKG in V4, V4, V6 that worsened on repeat EKG hours later. This is likely demand ischemia given that patient has dropped about 2 units over the last week.     Plan:  - Transfused 1U PRBC  - Started ASA 81 per cardiology recommendations. Gave 1 dose, given that patient's hemoglobin has dropped. Can consider more doses if the patient's hemoglobin trends up.   - Holding metoprolol in the setting of low blood pressure, can start as pressures improve.   - As per patient, he is allergic to statins. He stated that he gets light-headed with statins. Given recent blood loss and hypotension, will start patient on Rosuvastatin 5 and titrate up to therapeutic as the patient can tolerate.   - Continue trending EKG and troponin  - Cardiology on board  - Follow up on mag and phos     Elevated brain natriuretic peptide (BNP) level   Assessment & Plan    Patient is having lower extremity edema 1+ pitting to shins. He also had a high BNP on admission. CXR showed possible mild CHF. He is currently having an NSTEMI which could be contributing to elevated BNP. He also has an ROSANNE on CKD which could be contributing to elevation. He is having shortness of breath and it is unsure whether this is coming from lungs or abdominal distention.     Plan:  - Day team to consider  diuretics given history of CKD and mild CHF and hypotension on admission.   - Echo pending to evaluate.      Abdominal distention   Assessment & Plan    Patient states that his abdomen is usually big but in recent days it has been larger. His albumin is low at 3.1 Patient also states that his chemotherapy is given via a needle into his abdomen for 7 days. It's unclear whether this is peritoneal chemotherapy.     Plan:  - Abdominal ultrasound to see whether there is fluid in his abdomen to tap  - Ruling out potential SBP given that patient is getting needles in abdomen for a week  - NPO for abdominal ultrasound  - Empirically starting Zosyn given Leukocytosis, abdominal distention, possibility of SBP.      Acute kidney injury superimposed on chronic kidney disease (HCC)   Assessment & Plan    Patient's creatinine has increased to 4.55 from a baseline of around 2.9-3. The patient stated that he had one kidney taken out due to metastatic malignancy. ROSANNE is likely secondary to hypoperfusion given recent NSTEMI.     Plan:  - Gentle fluids 50cc/hr  as patient may have CHF.   - Continue to monitor for worsening renal function  - Bladder scan to see whether this is obstructive.   - Avoid nephrotoxic medications.        Hyperkalemia   Assessment & Plan    Likely secondary to kidney disease. Potassium baseline appears to be around 5.4, which is what his current labs show as well. However, given recent EKG changes and that he was transfused 1U PRBC, there is a probability that it will increase further.     Plan:  - Started Insulin and D5  - Hypocalcemia protocol  - Continue to monitor BMP for changes     GI bleed   Assessment & Plan    Patient is bleeding and losing hemoglobin. He was occult blood positive. He was also coughing streaks of blood. It is unclear if this is where he lost 2U of blood over the last few days. EGD and colonoscopy in 2016 showed diverticulosis and mild gastritis.     Plan:   - Transfusing RBC to  increase hemoglobin in hopes that it will help his NSTEMI as well.   - Continue to monitor H&H Q4h      Elevated liver enzymes   Assessment & Plan    AST is elevated at 54 and Alk Phos is elevated at 118. May be from demand ischemia/ recent chemotherapy. Unsure if Alk Phos is due to metastatic disease. Patient stated that the cancer is currently only in his bladder.     Plan:  - Ordered GGT to assess for whether alk phos elevation is from bones/liver  - Continue to monitor.      HTN (hypertension)- (present on admission)   Assessment & Plan    Patient has history of HTN. Given recent hypotension, holding hypertensive medications.        Anticipated Hospital stay:  >2 midnights    Quality Measures  Quality-Core Measures   Reviewed items::  EKG reviewed, Labs reviewed, Medications reviewed and Radiology images reviewed  Barger catheter::  No Barger  DVT prophylaxis pharmacological::  Contraindicated - High bleeding risk  DVT prophylaxis - mechanical:  SCDs    PCP: Fabien Faye D.O.

## 2019-01-03 NOTE — PROGRESS NOTES
Internal Medicine Interval Note  Note Author: Holley Malik M.D.     Name Kyle Dubois     1940   Age/Sex 78 y.o. male   MRN [id number]   Code Status DNR/DNI.     After 5PM or if no immediate response to page, please call for cross-coverage  Attending/Team: Dr. Gaffney See Patient List for primary contact information  Call [phone] to page     1st Call - Day Sr. Resident (R2/R3):   Dr. Malik         Reason for interval visit  (Principal Problem)   Admitted overnight with NSTEMI.        Interval Problem Daily Status Update  (24 hours, problem oriented, brief subjective history, new lab/imaging data pertinent to that problem)   Admitted with NSTEMI and elevated troponin, ROSANNE on CKD, placed on BiPAP.  Discussed goals of care with patient and wife, both of whom requested comfort measures.  Denies chest pain on interview this morning.        Review of Systems   Unable to perform ROS: Critical illness       Consultants/Specialty  PMMA  PCP: Fabien Faye D.O.      Quality Measures  Quality-Core Measures   Reviewed items::  Labs reviewed, Medications reviewed, Radiology images reviewed and EKG reviewed  On comfort care.        Physical Exam       Vitals:    19 0645 19 0700 19 0800 19 0830   BP:       Pulse: 76 78 80    Resp: 15 20 (!) 22    Temp:   37.2 °C (99 °F) 37.5 °C (99.5 °F)   TempSrc:   Barger    SpO2: 95% 94% 98%    Weight:         Body mass index is 26.55 kg/m². Weight: 79.2 kg (174 lb 9.7 oz)  Oxygen Therapy:  Pulse Oximetry: 98 %, O2 (LPM): 15, FiO2%: 60 %, O2 Delivery: BIPAP    Physical Exam  General: appears ill, lethargic, in some respiratory distress.   Eyes: PERRLA.    CV: RRR.  Pulm/chest: BiPAP in place, rales in b/l lower lobes.   Abd: distended, slightly greater than baseline (per patient).  BS present. Soft, no TTP, no guarding/rebound.  Neuro: lethargic.  Oriented to self, place.    Skin: diaphoretic.       Assessment/Plan       * NSTEMI (non-ST  elevated myocardial infarction) (HCC)- (present on admission)   Assessment & Plan    Patient Is having ST depressions on EKG in V4, V4, V6 that worsened on repeat EKG hours later. This is likely demand ischemia given that patient has dropped about 2 units over the last week.     Plan:  - Transfused 1U PRBC  - Aspirin, statin, metoprolol per Cardiology.  Metoprolol held secondary to hypotension.  - NTG infusion.  Morphine for chest pain.   - No cardiac intervention at this time per Cardiology.  Not candidate secondary to comorbid states and patient/family preference in goals of care.   - Not candidate for anticoagulation given Hgb drop 3 g in last two weeks.   - Transfuse if Hg < 8 given ischemia.      Acute respiratory failure with hypoxia (HCC)- (present on admission)   Assessment & Plan    Likely secondary to pulmonary edema (elevated BNP, CXR consistent with pulmonary edema) in setting of COPD.  DNR/DNI per patient and wife.  Was placed on BiPAP prior to comfort care measures.   Morphine (see orders) for pain and air hunger.       Abdominal distention- (present on admission)   Assessment & Plan    Patient states that his abdomen is usually big but in recent days it has been larger. His albumin is low at 3.1 Patient also states that his chemotherapy is given via a needle into his abdomen for 7 days. It's unclear whether this is peritoneal chemotherapy.   - Abd ultrasound revealing for trace ascites.  No abd pain/tenderness/rebound/guarding.    - Comfort measures.  Zosyn that was started in ED discontinued.       Chronic obstructive pulmonary disease with acute exacerbation (HCC)   Assessment & Plan    Known h/o COPD.  Ongoing tobacco use.   RT protocol.  Comfort care.  Morphine (see orders) for pain and air hunger.      Anemia- (present on admission)   Assessment & Plan    3 g decrease in Hgb since 12/21/2018.    No obvious evidence of GI bleed, though stool heme occult positive and thus GI bleed likely cause.    Comfort care.       Acute kidney injury superimposed on chronic kidney disease (HCC)   Assessment & Plan    Patient's creatinine has increased to 4.55 from a baseline of around 2.9-3. The patient stated that he had one kidney taken out due to metastatic malignancy. ROSANNE is likely secondary to hypoperfusion given recent NSTEMI.   - Gently hydrated with IVF fluids 50cc/hr prior to change to comfort care measures.        HTN (hypertension)- (present on admission)   Assessment & Plan    Patient has history of HTN. Given recent hypotension, holding hypertensive medications.      Elevated liver enzymes   Assessment & Plan    AST is elevated at 54 and Alk Phos is elevated at 118. May be from demand ischemia/ recent chemotherapy. Unsure if Alk Phos is due to metastatic disease. Patient stated that the cancer is currently only in his bladder.   - Secondary to above.   - Comfort care.     Hyperkalemia   Assessment & Plan    Likely secondary to kidney disease. Potassium baseline appears to be around 5.4, which is what his current labs show as well. However, given recent EKG changes and that he was transfused 1U PRBC, there is a probability that it will increase further.   - No response to Lasix 20 mg IV.  IV Lasix 80 mg ordered, but ultimately not given as patient changed to comfort care.

## 2019-01-03 NOTE — PROGRESS NOTES
MD Hamilton to bedside. Pt assessed by MD. Per MD administer 20mg lasix, insert veloz for accurate I/O, STAT RT breathing treatment and start Bipap and ABG 1 hour after Bipap.

## 2019-01-03 NOTE — ED TRIAGE NOTES
Patient is a transfer from [facility]. Patient transferred to [facility] due to NSTEMI and Anemia dx'd at [facility] today. Patient presented to their ED after feeling generalized weakness, fatigue and SOB x4 days pta. Patient given 324 mg chewable asa and 1 liter NS bolus at [facility] prior to transfer. Patient denies any chest pain at time of triage. Alert and oriented x4. Patient's hemoglobin at [facility] 8.7.

## 2019-01-03 NOTE — ASSESSMENT & PLAN NOTE
Presumably secondary to hypoperfusion state and acute myocardial infarction  Avoid nephrotoxins  Renally dose adjust medications as clinically appropriate  Monitor urine output, Place Barger catheter  Serial BMP, monitor renal function  Monitor for need for nephrology consultation and RRT  Patient is extremely poor candidate short-term as well as probably long-term for hemodialysis    Monitor

## 2019-01-03 NOTE — PROGRESS NOTES
Pt was transferred to ICU for worsening ST depression on EKG needing Nitro drip which is recommended by cardiology.   On arrival to ICU, pt is complaining of chest pain which is new for him, he has chest discomfort on admit.   Repeat EKG showed significant ST depression, Trop trended up to 15.46. CXR stat ordered.   Morphine Prn to control pain. Very difficult to access his pain because the pt is a poor historian.   Cardiology following the pt.     Pt is a poor candidate for invasive cardiac procedures since he has active metastasis prostate cancer with chemotherapy, severe ROSANNE & possible GI bleed.   Will attempt to call the pt's wife to address the code status & situations.     At 5: 30 AM, Discussed with the wife Holley on phone extensively about the pt's conditions.Pt's wife is very reasonable with his conditions & she was thinking about comfort care for him lately. She will come to [facility] right away. By the mean time, she agreed to change his code status to DNR/DNI. She stated that she is ready to let him go peacefully. Code status changed to DNR/DNI.

## 2019-01-03 NOTE — ASSESSMENT & PLAN NOTE
Likely secondary to kidney disease. Potassium baseline appears to be around 5.4, which is what his current labs show as well. However, given recent EKG changes and that he was transfused 1U PRBC, there is a probability that it will increase further.   - No response to Lasix 20 mg IV.  IV Lasix 80 mg ordered, but ultimately not given as patient changed to comfort care.

## 2019-01-03 NOTE — PROGRESS NOTES
Dr. Gaffney at bedside to discuss plan of care with patient's wife and daughter. Patient transitioned to comfort care.

## 2019-01-03 NOTE — ASSESSMENT & PLAN NOTE
AST is elevated at 54 and Alk Phos is elevated at 118. May be from demand ischemia/ recent chemotherapy. Unsure if Alk Phos is due to metastatic disease. Patient stated that the cancer is currently only in his bladder.   - Secondary to above.   - Comfort care.

## 2019-01-03 NOTE — ASSESSMENT & PLAN NOTE
Patient is bleeding and losing hemoglobin. He was occult blood positive. He was also coughing streaks of blood. It is unclear if this is where he lost 2U of blood over the last few days. EGD and colonoscopy in 2016 showed diverticulosis and mild gastritis.     Plan:   - Transfusing RBC to increase hemoglobin in hopes that it will help his NSTEMI as well.   - Continue to monitor H&H Q4h

## 2019-01-03 NOTE — CONSULTS
Critical Care Consultation    Date of consult: 1/3/2019    Referring Physician  Altaf Hamilton M.D.    Reason for Consultation  Moved to ICU for worsening EKG changes in a patient admitted to telemetry for NSTEMI and for worsening shortness of breath    History of Presenting Illness  78 y.o. male who presented 1/2/2019 with generalized fatigue for 4 days.  He was transferred in from outside facility for cardiology consultation.  He was initially admitted to telemetry and diagnosed with NSTEMI and felt not to be a candidate for intervention.  Underlying malignancy, possible GI bleeding and age all were factors in that decision.  Patient developed worsening EKG changes and shortness of breath and subsequently became lethargic and was transferred to the [facility] for further care.  Troponin I is trending up.  Resident spoke with patient's wife at great length and patient did have an advanced directive and did not want to be intubated or mechanically ventilated.  Patient's chest x-ray showed worsening pulmonary edema and he had anterior wheezes and bibasilar rales and was felt to be in CHF.  He was started on nitroglycerin drip initially for chest pain.  He separately given morphine and nebulizer treatments and placed on BiPAP.  I spoke with cardiology at length about his current condition and they reiterated he was not interventional candidate.  Resident staff had initiated him on antibiotics for possible abdominal infection but his abdomen remains benign.  Hemoglobin was trending down and transfusion with packed cells ordered by house staff upon transfer, most recent hemoglobin 8.3 down from 11.5 on 12/21/2018, 1 unit of blood was ordered.  There was no evidence of bleeding clinically on review with nursing staff in the ICU as well as nursing staff on telemetry.  Patient was lethargic but arousable and confused.  He did follow all simple commands and was able to answer some orientation questions correctly.    Code  Status  Comfort Care/DNR    Review of Systems  Review of Systems   Unable to perform ROS: Acuity of condition       Past Medical History   has a past medical history of Chronic pain; Hematuria; History of prostate cancer; Hypertension; Mixed dyslipidemia; Nicotine dependence; Proteinuria; Renal disorder; Strep throat; and Vitamin D deficiency.    Surgical History   has a past surgical history that includes hernia repair (1965); prostatectomy, radical retro (2000); cataract extraction with iol (01/2011); other; colonoscopy - endo (N/A, 4/6/2016); and gastroscopy-endo (N/A, 4/6/2016).    Family History  family history includes Heart Disease in his mother; Other in his brother.    Social History   reports that he has quit smoking. His smoking use included Cigarettes. He has a 25.00 pack-year smoking history. He has never used smokeless tobacco. He reports that he does not drink alcohol or use drugs.    Medications  Home Medications     Reviewed by Leandra Bauer R.N. (Registered Nurse) on 01/03/19 at 0207  Med List Status: Complete   Medication Last Dose Status   aspirin EC (ECOTRIN) 81 MG TBEC 1/2/2019 Active   furosemide (LASIX) 40 MG Tab 1/2/2019 Active   lisinopril (PRINIVIL, ZESTRIL) 40 MG tablet 1/2/2019 Active   patiromer (VELTASSA) 16.8 g Pack 12/28/2018 Active   pentosan (ELMIRON) 100 MG Cap 1/2/2019 Active   pramipexole (MIRAPEX) 0.25 MG Tab 1/2/2019 Active   vitamin D (CHOLECALCIFEROL) 1000 UNIT Tab 1/2/2019 Active              Current Facility-Administered Medications   Medication Dose Route Frequency Provider Last Rate Last Dose   • MD ALERT...adult comfort care   Other PRN Gabriel Gaffney M.D.       • atropine 1 % ophthalmic solution 2 Drop  2 Drop Sublingual Q4HRS PRN Gabriel Gaffney M.D.       • morphine (ROXANOL) 20 MG/ML oral conc 10 mg  10 mg Oral Q HOUR PRN Gabriel Gaffney M.D.        Or   • morphine (pf) 10 mg/mL injection 5 mg  5 mg Intravenous Q HOUR PRN Gabriel Gaffney M.D.        Or    • morphine (pf) 10 mg/mL injection 10 mg  10 mg Intravenous Q HOUR PRN Gabriel Gaffney M.D.       • ondansetron (ZOFRAN ODT) dispertab 8 mg  8 mg Oral Q8HRS PRN Gabriel Gaffney M.D.        Or   • ondansetron (ZOFRAN) syringe/vial injection 8 mg  8 mg Intravenous Q8HRS PRN Gabriel Gaffney M.D.       • LORazepam (ATIVAN) 2 MG/ML oral conc 1 mg  1 mg Sublingual Q HOUR PRN Gabriel Gaffney M.D.        Or   • LORazepam (ATIVAN) injection 1 mg  1 mg Intravenous Q HOUR PRN Gabriel Gaffney M.D.           Allergies  Allergies   Allergen Reactions   • Statins [Hmg-Coa-R Inhibitors] Unspecified     Lightheadedness   • Influenza Vaccine Live        Vital Signs last 24 hours  Temp:  [36.7 °C (98 °F)-37.5 °C (99.5 °F)] 37.5 °C (99.5 °F)  Pulse:  [65-96] 80  Resp:  [15-28] 22  BP: ()/(50-67) 118/67    Physical Exam  Physical Exam   Constitutional: He appears well-nourished. He appears lethargic. He appears toxic. He appears distressed. Face mask in place.   HENT:   Head: Normocephalic and atraumatic.   Mouth/Throat: Mucous membranes are dry and not cyanotic.   Eyes: Pupils are equal, round, and reactive to light. No scleral icterus.   Neck: Neck supple. JVD present. Normal carotid pulses present.   Cardiovascular: Normal rate, regular rhythm and intact distal pulses.  Exam reveals no gallop and no friction rub.    No murmur heard.  Pulmonary/Chest: Accessory muscle usage present. No stridor. Tachypnea noted. He is in respiratory distress. He has decreased breath sounds. He has wheezes in the right upper field and the left upper field. He has no rhonchi. He has rales in the right lower field and the left lower field.   Abdominal: Soft. Bowel sounds are normal. He exhibits no distension and no fluid wave. There is no tenderness. There is no guarding and no CVA tenderness.   Musculoskeletal: He exhibits edema.   Neurological: He has normal strength. He appears lethargic. No cranial nerve deficit or sensory deficit.  GCS eye subscore is 4. GCS verbal subscore is 4. GCS motor subscore is 6.   Skin: Skin is warm. No rash noted. He is diaphoretic. No cyanosis. Nails show no clubbing.   Psychiatric:   Unable to assess   Vitals reviewed.      Fluids    Intake/Output Summary (Last 24 hours) at 01/03/19 0905  Last data filed at 01/03/19 0800   Gross per 24 hour   Intake           779.23 ml   Output              250 ml   Net           529.23 ml       Laboratory  Recent Results (from the past 48 hour(s))   LIPASE    Collection Time: 01/02/19  3:00 PM   Result Value Ref Range    Lipase 273 73 - 393 U/L   LACTIC ACID    Collection Time: 01/02/19  3:00 PM   Result Value Ref Range    Lactic Acid 1.7 0.4 - 2.0 mmol/L   COMP METABOLIC PANEL    Collection Time: 01/02/19  3:00 PM   Result Value Ref Range    Sodium 140 136 - 145 mmol/L    Potassium 5.4 (H) 3.5 - 5.1 mmol/L    Chloride 103 98 - 107 mmol/L    Co2 28 21 - 32 mmol/L    Anion Gap 14 10 - 18 mmol/L    Glucose 128 (H) 74 - 99 mg/dL    Bun 51 (H) 7 - 18 mg/dL    Creatinine 4.5 (HH) 0.8 - 1.3 mg/dL    Calcium 9.0 8.5 - 11.0 mg/dL    AST(SGOT) 54 (H) 15 - 37 U/L    ALT(SGPT) 30 12 - 78 U/L    Alkaline Phosphatase 118 (H) 46 - 116 U/L    Total Bilirubin 0.2 0.2 - 1.0 mg/dL    Albumin 3.1 (L) 3.4 - 5.0 g/dL    Total Protein 6.7 6.4 - 8.2 g/dL    A-G Ratio 0.9    CBC WITH DIFFERENTIAL    Collection Time: 01/02/19  3:00 PM   Result Value Ref Range    WBC 18.5 (H) 4.8 - 10.8 K/uL    RBC 2.80 (L) 4.70 - 6.10 M/uL    Hemoglobin 8.7 (L) 14.0 - 18.0 g/dL    Hematocrit 27.0 (L) 42.0 - 52.0 %    MCV 96.4 (H) 80.0 - 94.0 fL    MCH 31.1 (H) 27.0 - 31.0 pg    MCHC 32.2 (L) 33.0 - 37.0 g/dL    RDW 12.9 11.5 - 14.5 %    Platelet Count 155 130 - 400 K/uL    MPV 11.1 (H) 7.4 - 10.4 fL    Neutrophils Automated 60.4 39.0 - 70.0 %    Lymphocytes Automated 10.9 (L) 21.0 - 50.0 %    Monocytes Automated 7.9 2.0 - 9.0 %    Eosinophils Automated 0.0 0.0 - 5.0 %    Basophils Automated 0.3 0.0 - 3.0 %    Abs  Neutrophils Automated 11.2 (H) 1.8 - 7.7 K/uL    Abs Lymph Automated 2.0 1.2 - 4.8 K/uL    Eosinophil Count, Blood 0.00 0.00 - 0.50 K/uL    Neutrophils-Polys 73 (H) 39 - 70 %    Lymphocytes 9.0 (L) 21.0 - 50.0 %    Monocytes 7.0 2.0 - 9.0 %    Nucleated RBC 1 /100 WBC    Neutrophils (Absolute) 13.9 (H) 1.8 - 7.7 K/uL   TROPONIN    Collection Time: 19  3:00 PM   Result Value Ref Range    Troponin I 11.00 (HH) 0.00 - 0.06 ng/mL   BTYPE NATRIURETIC PEPTIDE    Collection Time: 19  3:00 PM   Result Value Ref Range    B Natriuretic Peptide 1150 (H) 5 - 179 pg/mL   MAGNESIUM    Collection Time: 19  3:00 PM   Result Value Ref Range    Magnesium 2.2 1.8 - 2.4 mg/dL   ESTIMATED GFR    Collection Time: 19  3:00 PM   Result Value Ref Range    GFR If  15 (A) >60 mL/min/1.73 m 2    GFR If Non  13 (A) >60 mL/min/1.73 m 2   DIFFERENTIAL MANUAL    Collection Time: 19  3:00 PM   Result Value Ref Range    Bands-Stabs 9.0 (H) 0.0 - 6.0 %    Metamyelocytes 1 %    Myelocytes 1 %   INFLUENZA RAPID    Collection Time: 19  3:10 PM   Result Value Ref Range    Influenza A Ag Negative Negative    Influenza B Ag Negative Negative   EKG    Collection Time: 19  7:00 PM   Result Value Ref Range    Report       [facility] Emergency Dept.    Test Date:  2019  Pt Name:    NIURKA HERNANDEZ                   Department: ER  MRN:        [id number]                      Room:       [facility]  Gender:     Male                         Technician: [id number]  :        1940                   Requested By:ER TRIAGE PROTOCOL  Order #:    [id number]                    Reading MD:    Measurements  Intervals                                Axis  Rate:       71                           P:          74  NH:         168                          QRS:        54  QRSD:       92                           T:          106  QT:         380  QTc:        413    Interpretive  Statements  SINUS RHYTHM  REPOL ABNRM SUGGESTS ISCHEMIA, DIFFUSE LEADS  No previous ECG available for comparison     COD - Adult (Type and Screen)    Collection Time: 19  7:35 PM   Result Value Ref Range    ABO Grouping Only A     Rh Grouping Only POS     Antibody Screen-Cod NEG     Component R       R3                  Red Blood Cells3    [id number]   transfused   19   23:07      Product Type Red Blood Cells LR Pheresis     Dispense Status Transfused     Unit Number (Barcoded) [id number]     Product Code (Barcoded) A0431J93     Blood Type (Barcoded) 6200    ABO AND RH CONFIRMATION    Collection Time: 19  8:15 PM   Result Value Ref Range    ABO Confirm A     Second Rh Group POS    CBC WITHOUT DIFFERENTIAL    Collection Time: 19  9:52 PM   Result Value Ref Range    WBC 18.5 (H) 4.8 - 10.8 K/uL    RBC 2.69 (L) 4.70 - 6.10 M/uL    Hemoglobin 8.3 (L) 14.0 - 18.0 g/dL    Hematocrit 27.1 (L) 42.0 - 52.0 %    .7 (H) 81.4 - 97.8 fL    MCH 30.9 27.0 - 33.0 pg    MCHC 30.6 (L) 33.7 - 35.3 g/dL    RDW 47.2 35.9 - 50.0 fL    Platelet Count 136 (L) 164 - 446 K/uL    MPV 10.9 9.0 - 12.9 fL   EKG    Collection Time: 19  2:11 AM   Result Value Ref Range    Report       [facility]    Test Date:  2019  Pt Name:    NIURKA HERNANDEZ                   Department: ER  MRN:        [id number]                      Room:       01  Gender:     Male                         Technician: CHRISTIN  :        1940                   Requested By:SHERRY HARDY  Order #:    [id number]                    Reading MD: Altaf Bray MD    Measurements  Intervals                                Axis  Rate:       90                           P:          80  AK:         160                          QRS:        51  QRSD:       90                           T:          125  QT:         352  QTc:        431    Interpretive Statements  SINUS RHYTHM  ST SEGMENT DEPRESSION, ANTERIOR AND LATERAL LEADS, ACUTE  POSTERIOR MYOCARDIAL    INFARCTION OR ANTERIOR ISCHEMIA    Electronically Signed On 1-3-2019 8:41:23 PST by Altaf Bray MD     CBC WITH DIFFERENTIAL    Collection Time: 01/03/19  3:51 AM   Result Value Ref Range    WBC 22.5 (H) 4.8 - 10.8 K/uL    RBC 3.06 (L) 4.70 - 6.10 M/uL    Hemoglobin 9.5 (L) 14.0 - 18.0 g/dL    Hematocrit 31.4 (L) 42.0 - 52.0 %    .6 (H) 81.4 - 97.8 fL    MCH 31.0 27.0 - 33.0 pg    MCHC 30.3 (L) 33.7 - 35.3 g/dL    RDW 50.8 (H) 35.9 - 50.0 fL    Platelet Count 155 (L) 164 - 446 K/uL    MPV 10.8 9.0 - 12.9 fL    Nucleated RBC 1.90 /100 WBC    NRBC (Absolute) 0.42 K/uL    Neutrophils-Polys 77.20 (H) 44.00 - 72.00 %    Lymphocytes 5.20 (L) 22.00 - 41.00 %    Monocytes 3.50 0.00 - 13.40 %    Eosinophils 0.90 0.00 - 6.90 %    Basophils 0.00 0.00 - 1.80 %    Neutrophils (Absolute) 18.16 (H) 1.82 - 7.42 K/uL    Lymphs (Absolute) 1.17 1.00 - 4.80 K/uL    Monos (Absolute) 0.79 0.00 - 0.85 K/uL    Eos (Absolute) 0.20 0.00 - 0.51 K/uL    Baso (Absolute) 0.00 0.00 - 0.12 K/uL   IRON/TOTAL IRON BIND    Collection Time: 01/03/19  3:51 AM   Result Value Ref Range    Iron 69 50 - 180 ug/dL    Total Iron Binding 326 250 - 450 ug/dL    % Saturation 21 15 - 55 %   LACTIC ACID    Collection Time: 01/03/19  3:51 AM   Result Value Ref Range    Lactic Acid 1.1 0.5 - 2.0 mmol/L   MAGNESIUM    Collection Time: 01/03/19  3:51 AM   Result Value Ref Range    Magnesium 2.2 1.5 - 2.5 mg/dL   PHOSPHORUS    Collection Time: 01/03/19  3:51 AM   Result Value Ref Range    Phosphorus 4.6 (H) 2.5 - 4.5 mg/dL   PROCALCITONIN    Collection Time: 01/03/19  3:51 AM   Result Value Ref Range    Procalcitonin 0.34 (H) <0.25 ng/mL   TROPONIN    Collection Time: 01/03/19  3:51 AM   Result Value Ref Range    Troponin I 15.46 (H) 0.00 - 0.04 ng/mL   TSH (Thyroid Stimulating Hormone)    Collection Time: 01/03/19  3:51 AM   Result Value Ref Range    TSH 1.480 0.380 - 5.330 uIU/mL   COMP METABOLIC PANEL    Collection Time:  01/03/19  3:51 AM   Result Value Ref Range    Sodium 138 135 - 145 mmol/L    Potassium 5.4 3.6 - 5.5 mmol/L    Chloride 105 96 - 112 mmol/L    Co2 28 20 - 33 mmol/L    Anion Gap 5.0 0.0 - 11.9    Glucose 223 (H) 65 - 99 mg/dL    Bun 47 (H) 8 - 22 mg/dL    Creatinine 3.92 (H) 0.50 - 1.40 mg/dL    Calcium 8.3 (L) 8.5 - 10.5 mg/dL    AST(SGOT) 33 12 - 45 U/L    ALT(SGPT) 24 2 - 50 U/L    Alkaline Phosphatase 102 (H) 30 - 99 U/L    Total Bilirubin 0.3 0.1 - 1.5 mg/dL    Albumin 3.6 3.2 - 4.9 g/dL    Total Protein 6.3 6.0 - 8.2 g/dL    Globulin 2.7 1.9 - 3.5 g/dL    A-G Ratio 1.3 g/dL   GAMMA GT (GGT)    Collection Time: 01/03/19  3:51 AM   Result Value Ref Range    Gamma Gt 23 7 - 51 U/L   FERRITIN    Collection Time: 01/03/19  3:51 AM   Result Value Ref Range    Ferritin 429.7 (H) 22.0 - 322.0 ng/mL   BTYPE NATRIURETIC PEPTIDE    Collection Time: 01/03/19  3:51 AM   Result Value Ref Range    B Natriuretic Peptide 911 (H) 0 - 100 pg/mL   ESTIMATED GFR    Collection Time: 01/03/19  3:51 AM   Result Value Ref Range    GFR If  18 (A) >60 mL/min/1.73 m 2    GFR If Non African American 15 (A) >60 mL/min/1.73 m 2   DIFFERENTIAL MANUAL    Collection Time: 01/03/19  3:51 AM   Result Value Ref Range    Bands-Stabs 3.50 0.00 - 10.00 %    Metamyelocytes 4.40 %    Myelocytes 5.30 %    Manual Diff Status PERFORMED    PERIPHERAL SMEAR REVIEW    Collection Time: 01/03/19  3:51 AM   Result Value Ref Range    Peripheral Smear Review see below    PLATELET ESTIMATE    Collection Time: 01/03/19  3:51 AM   Result Value Ref Range    Plt Estimation Decreased    MORPHOLOGY    Collection Time: 01/03/19  3:51 AM   Result Value Ref Range    RBC Morphology Present     Poikilocytosis 1+     Ovalocytes 1+     Toxic Gran Slight    EKG - STAT Once    Collection Time: 01/03/19  4:19 AM   Result Value Ref Range    Report       [facility]    Test Date:  2019-01-03  Pt Name:    NIURKA HERNANDEZ                   Department: 183  MRN:         [id number]                      Room:       T601  Gender:     Male                         Technician: RISHIG  :        1940                   Requested By:YEHUDA VARGAS  Order #:    [id number]                    Reading MD: Altaf Bray MD    Measurements  Intervals                                Axis  Rate:       91                           P:          78  ND:         160                          QRS:        53  QRSD:       94                           T:          47  QT:         392  QTc:        483    Interpretive Statements  SINUS RHYTHM  ST SEGMENT DEPRESSION, ANTERIOR AND LATERAL LEADS, ACUTE POSTERIOR MYOCARDIAL    INFARCTION OR ANTERIOR ISCHEMIA    Electronically Signed On 1-3-2019 8:20:40 PST by Altaf Bray MD     EKG in four (4) hours    Collection Time: 19  7:24 AM   Result Value Ref Range    Report       [facility]    Test Date:  2019  Pt Name:    NIURKA HERNANDEZ                   Department: [facility]  MRN:        [id number]                      Room:       [facility]  Gender:     Male                         Technician: DL  :        1940                   Requested By:YEHUDA VARGAS  Order #:    [id number]                    Reading MD: Altaf Bray MD    Measurements  Intervals                                Axis  Rate:       83                           P:          77  ND:         164                          QRS:        60  QRSD:       90                           T:          104  QT:         412  QTc:        485    Interpretive Statements  SINUS RHYTHM  ST SEGMENT DEPRESSION, LATERAL LEADS, CONSIDER ISCHEMIA  BORDERLINE PROLONGED QT INTERVAL      Electronically Signed On 1-3-2019 8:46:41 PST by Altaf Bray MD     ISTAT ARTERIAL BLOOD GAS    Collection Time: 19  7:33 AM   Result Value Ref Range    Ph 7.204 (LL) 7.400 - 7.500    Pco2 68.3 (HH) 26.0 - 37.0 mmHg    Po2 84 64 - 87 mmHg    Tco2 29 20 - 33 mmol/L    S02 93 93 - 99 %    Hco3 27.0 (H) 17.0 -  25.0 mmol/L    BE -2 -4 - 3 mmol/L    Body Temp 37.2 C degrees    O2 Therapy 60 %    iPF Ratio 140     Ph Temp Jolanta 7.202 (LL) 7.400 - 7.500    Pco2 Temp Co 68.9 (HH) 26.0 - 37.0 mmHg    Po2 Temp Cor 86 64 - 87 mmHg    Specimen Arterial     Action Range Triggered YES     Inst. Qualified Patient YES        Imaging  DX-CHEST-LIMITED (1 VIEW)   Final Result      Interstitial opacification is worrisome for edema. Underlying scarring is also possible      US-ABDOMEN COMPLETE SURVEY    (Results Pending)       Assessment/Plan  * NSTEMI (non-ST elevated myocardial infarction) ([facility])   Assessment & Plan    Transfer to [facility]  Serial troponin I  Follow-up echocardiogram  Nitroglycerin infusion  Aspirin therapy  Morphine for chest pain  No cardiac intervention per cardiology at this time  Not a candidate for anticoagulation with hemoglobin drop of 3 g in the last couple weeks  Transfuse to hemoglobin greater than 8 with ischemia  Reviewed with cardiology     Acute pulmonary edema (HCC)   Assessment & Plan    Chest x-ray consistent with worsening pulmonary edema  Clinical history suggestive of evolving myocardial infarction  Forced diuresis  IV morphine  Fluid restriction  Elevation of patient head of bed greater than 30-45 degrees  IV nitroglycerin  Optimize electrolytes     Acute respiratory failure with hypoxia ([facility])   Assessment & Plan    Suspect related to pulmonary edema (BNP elevated/chest x-ray consistent with pulmonary edema) on top of COPD.  Less likely COPD exacerbation but patient does have bronchospasm although I suspect is cardiac asthma  Doubt pneumonic process  DuoNeb every 4 hours and every 2 hours as needed  AVAPS empirically, (auto BiPAP)  Initial settings: Min EPAP 8, min pressure support 10, backup rate 12, tidal volume 7 cc/kg  Full facemask  Titrate O2 to greater than 92%  Forced diuresis  Hold on steroids at this time  High flow nasal cannula if he cannot tolerate BiPAP       Abdominal distention- (present  on admission)   Assessment & Plan    Patient  Abdomen benign by my exam  History of diverticulosis?  Ultrasound abdomen pending to evaluate for ascites  Empiric antibiotics started by emergency room physician/admitting hospitalist team secondary to concern of possible intra-abdominal infection, patient apparently has received recent chemotherapy, unknown medication     Chronic obstructive pulmonary disease with acute exacerbation (HCC)   Assessment & Plan    Known COPD  Ongoing tobacco abuse  RT protocols  If nebulizer trial successful consider IV steroids  Suspect cardiac asthma and CHF from myocardial infarction more likely  Underlying chronic lung disease clearly contributing though to respiratory failure, suspect significant hypercarbia  Trial AVAPS auto BiPAP     Anemia   Assessment & Plan    Drop in hemoglobin of 3 g between 12/21/2018 and admit  No clear evidence of acute GI bleeding but that is what is suspected secondary to heme positive stools and worsening anemia  Transfuse to hemoglobin greater than 8 with ischemia  Significant contraindication to heparin therapy per cardiology, I agree  PPI therapy for now  May need GI eval  Monitor closely for GI bleeding clinically  Main 2 peripheral IV access with reasonable caliber     Acute kidney injury superimposed on chronic kidney disease (HCC)   Assessment & Plan    Presumably secondary to hypoperfusion state and acute myocardial infarction  Avoid nephrotoxins  Renally dose adjust medications as clinically appropriate  Monitor urine output, Place Barger catheter  Serial BMP, monitor renal function  Monitor for need for nephrology consultation and RRT  Patient is extremely poor candidate short-term as well as probably long-term for hemodialysis     HTN (hypertension)- (present on admission)   Assessment & Plan    Monitor for hypotension  Would use nitro and morphine for CHF and hope for better control hypertension  Additional therapy with beta-blockers as  clinically tolerated     Elevated liver enzymes   Assessment & Plan    Secondary to above  Avoid hepatotoxins  Serial CMP  Monitor for worsening hepatic function including hypoglycemia, coagulopathy and encephalopathy secondary to elevated ammonia     Hyperkalemia   Assessment & Plan    Mild at this time  Presumed secondary to above  Monitor for the need for pharmacological therapy  Serial BMP         Discussed patient condition and risk of morbidity and/or mortality with RN, RT, [employer] [facility] resident, Patient and cardiology.      Patient is critically ill.  Suspect the patient is having an evolving myocardial infarction and going into heart failure along with respiratory failure that is probably hypoxemic and hypercarbic.  He has known COPD and is on home oxygen therapy with home inhalational therapy as well.  Nebulizer treatments IV Lasix and IV morphine will be administered.  Auto BiPAP in AVAPS mode will be initiated.  Blood gas be obtained in 1 hour.  Prognosis is poor.    The patient remains critically ill.  Critical care time = 50 minutes in directly providing and coordinating critical care and extensive data review.  No time overlap and excludes procedures.

## 2019-01-03 NOTE — CONSULTS
Reason for Consult:  Asked by Dr Kelley to see this patient with  NSTEMI  Patient's PCP: Fabien Faye D.O.    CC:   Chief Complaint   Patient presents with   • MI (Non ST Segment Elevation MI)   • Anemia       HPI:    78-year-old male patient with no prior cardiac history presented with 1 day history of diarrhea, coffee-ground emesis.  He also complains of shortness of breath, tightness across his chest for last 1-2 days.  No blood in the stools.  He felt very weak today, went to emergency room and found to have non-ST elevation MI and transferred here for further care.    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    Medications / Drug list prior to admission:  No current facility-administered medications on file prior to encounter.      Current Outpatient Prescriptions on File Prior to Encounter   Medication Sig Dispense Refill   • vitamin D (CHOLECALCIFEROL) 1000 UNIT Tab Take 1,000 Units by mouth every day.     • furosemide (LASIX) 40 MG Tab Take 40 mg by mouth every day.     • pramipexole (MIRAPEX) 0.25 MG Tab Take 0.25 mg by mouth 3 times a day.     • pentosan (ELMIRON) 100 MG Cap Take 100 mg by mouth every day.     • lisinopril (PRINIVIL, ZESTRIL) 40 MG tablet Take 40 mg by mouth.     • aspirin EC (ECOTRIN) 81 MG TBEC Take 81 mg by mouth every day.         Current list of administered Medications:    Current Facility-Administered Medications:   •  NS infusion, , Intravenous, Continuous, Warren Al M.D.    Current Outpatient Prescriptions:   •  patiromer (VELTASSA) 16.8 g Pack, Take 16.8 g by mouth every Friday., Disp: , Rfl:   •  vitamin D (CHOLECALCIFEROL) 1000 UNIT Tab, Take 1,000 Units by mouth every day., Disp: , Rfl:   •  furosemide (LASIX) 40 MG Tab, Take 40 mg by mouth every day., Disp: , Rfl:   •  pramipexole (MIRAPEX) 0.25 MG Tab, Take 0.25 mg by  mouth 3 times a day., Disp: , Rfl:   •  pentosan (ELMIRON) 100 MG Cap, Take 100 mg by mouth every day., Disp: , Rfl:   •  lisinopril (PRINIVIL, ZESTRIL) 40 MG tablet, Take 40 mg by mouth., Disp: , Rfl:   •  aspirin EC (ECOTRIN) 81 MG TBEC, Take 81 mg by mouth every day., Disp: , Rfl:     Past Medical History:   Diagnosis Date   • Chronic pain     legs   • Hematuria    • History of prostate cancer    • Hypertension    • Mixed dyslipidemia    • Nicotine dependence    • Proteinuria     followed by nephrology   • Renal disorder     protein in urine   • Strep throat    • Vitamin D deficiency        Past Surgical History:   Procedure Laterality Date   • COLONOSCOPY - ENDO N/A 4/6/2016    Procedure: COLONOSCOPY - ENDO;  Surgeon: Caesar Hernandez M.D.;  Location: SURGERY [facility];  Service:    • GASTROSCOPY-ENDO N/A 4/6/2016    Procedure: GASTROSCOPY-ENDO;  Surgeon: Caesar Hernandez M.D.;  Location: SURGERY [facility];  Service:    • CATARACT EXTRACTION WITH IOL  01/2011   • PROSTATECTOMY, RADICAL RETRO  2000   • HERNIA REPAIR  1965    inguinal   • OTHER      epidural  recent       Family History   Problem Relation Age of Onset   • Heart Disease Mother    • Other Brother         chirrosis of liver       Social History     Social History   • Marital status:      Spouse name: N/A   • Number of children: N/A   • Years of education: N/A     Occupational History   • Not on file.     Social History Main Topics   • Smoking status: Former Smoker     Packs/day: 0.50     Years: 50.00     Types: Cigarettes   • Smokeless tobacco: Never Used   • Alcohol use No   • Drug use: No   • Sexual activity: Not on file     Other Topics Concern   • Not on file     Social History Narrative   • No narrative on file       ALLERGIES:  Allergies   Allergen Reactions   • Statins [Hmg-Coa-R Inhibitors] Unspecified     Severe leg weakness   • Influenza Vaccine Live        Review of systems:  A detailed review of symptoms was  reviewed with patient. This is reviewed in H&P and PMH. ALL OTHERS reviewed and negative    Physical exam:  Patient Vitals for the past 24 hrs:   BP Temp Pulse Resp SpO2 Weight   01/02/19 1845 (!) 98/61 36.7 °C (98 °F) 73 16 94 % -   01/02/19 1844 - - - - - 78.9 kg (174 lb)       General: No acute distress.   EYES: no jaundice  HEENT: OP clear   Neck: No bruits No JVD.   CVS:   RRR. S1 + S2. No M/R/G  Resp: basal mild crackles.  Abdomen: Soft, NT, ND,  Skin: Grossly nothing acute no obvious rashes  Neurological: Alert, Moves all extremities, no cranial nerve defects on limited exam  Extremities: Pulse 2+ in b/l LE.  no edema. No cyanosis.       Data:  Laboratory studies:  Recent Results (from the past 24 hour(s))   LIPASE    Collection Time: 01/02/19  3:00 PM   Result Value Ref Range    Lipase 273 73 - 393 U/L   LACTIC ACID    Collection Time: 01/02/19  3:00 PM   Result Value Ref Range    Lactic Acid 1.7 0.4 - 2.0 mmol/L   COMP METABOLIC PANEL    Collection Time: 01/02/19  3:00 PM   Result Value Ref Range    Sodium 140 136 - 145 mmol/L    Potassium 5.4 (H) 3.5 - 5.1 mmol/L    Chloride 103 98 - 107 mmol/L    Co2 28 21 - 32 mmol/L    Anion Gap 14 10 - 18 mmol/L    Glucose 128 (H) 74 - 99 mg/dL    Bun 51 (H) 7 - 18 mg/dL    Creatinine 4.5 (HH) 0.8 - 1.3 mg/dL    Calcium 9.0 8.5 - 11.0 mg/dL    AST(SGOT) 54 (H) 15 - 37 U/L    ALT(SGPT) 30 12 - 78 U/L    Alkaline Phosphatase 118 (H) 46 - 116 U/L    Total Bilirubin 0.2 0.2 - 1.0 mg/dL    Albumin 3.1 (L) 3.4 - 5.0 g/dL    Total Protein 6.7 6.4 - 8.2 g/dL    A-G Ratio 0.9    CBC WITH DIFFERENTIAL    Collection Time: 01/02/19  3:00 PM   Result Value Ref Range    WBC 18.5 (H) 4.8 - 10.8 K/uL    RBC 2.80 (L) 4.70 - 6.10 M/uL    Hemoglobin 8.7 (L) 14.0 - 18.0 g/dL    Hematocrit 27.0 (L) 42.0 - 52.0 %    MCV 96.4 (H) 80.0 - 94.0 fL    MCH 31.1 (H) 27.0 - 31.0 pg    MCHC 32.2 (L) 33.0 - 37.0 g/dL    RDW 12.9 11.5 - 14.5 %    Platelet Count 155 130 - 400 K/uL    MPV 11.1 (H) 7.4 -  10.4 fL    Neutrophils Automated 60.4 39.0 - 70.0 %    Lymphocytes Automated 10.9 (L) 21.0 - 50.0 %    Monocytes Automated 7.9 2.0 - 9.0 %    Eosinophils Automated 0.0 0.0 - 5.0 %    Basophils Automated 0.3 0.0 - 3.0 %    Abs Neutrophils Automated 11.2 (H) 1.8 - 7.7 K/uL    Abs Lymph Automated 2.0 1.2 - 4.8 K/uL    Eosinophil Count, Blood 0.00 0.00 - 0.50 K/uL    Neutrophils-Polys 73 (H) 39 - 70 %    Lymphocytes 9.0 (L) 21.0 - 50.0 %    Monocytes 7.0 2.0 - 9.0 %    Nucleated RBC 1 /100 WBC    Neutrophils (Absolute) 13.9 (H) 1.8 - 7.7 K/uL   TROPONIN    Collection Time: 19  3:00 PM   Result Value Ref Range    Troponin I 11.00 (HH) 0.00 - 0.06 ng/mL   BTYPE NATRIURETIC PEPTIDE    Collection Time: 19  3:00 PM   Result Value Ref Range    B Natriuretic Peptide 1150 (H) 5 - 179 pg/mL   MAGNESIUM    Collection Time: 19  3:00 PM   Result Value Ref Range    Magnesium 2.2 1.8 - 2.4 mg/dL   ESTIMATED GFR    Collection Time: 19  3:00 PM   Result Value Ref Range    GFR If  15 (A) >60 mL/min/1.73 m 2    GFR If Non  13 (A) >60 mL/min/1.73 m 2   DIFFERENTIAL MANUAL    Collection Time: 19  3:00 PM   Result Value Ref Range    Bands-Stabs 9.0 (H) 0.0 - 6.0 %    Metamyelocytes 1 %    Myelocytes 1 %   INFLUENZA RAPID    Collection Time: 19  3:10 PM   Result Value Ref Range    Influenza A Ag Negative Negative    Influenza B Ag Negative Negative   EKG    Collection Time: 19  7:00 PM   Result Value Ref Range    Report       [facility] Emergency Dept.    Test Date:  2019  Pt Name:    NIURKA HERNANDEZ                   Department: ER  MRN:        [id number]                      Room:       [facility]  Gender:     Male                         Technician: [id number]  :        1940                   Requested By:ER TRIAGE PROTOCOL  Order #:    [id number]                    Reading MD:    Measurements  Intervals                                Axis  Rate:        71                           P:          74  IN:         168                          QRS:        54  QRSD:       92                           T:          106  QT:         380  QTc:        413    Interpretive Statements  SINUS RHYTHM  REPOL ABNRM SUGGESTS ISCHEMIA, DIFFUSE LEADS  No previous ECG available for comparison     COD - Adult (Type and Screen)    Collection Time: 01/02/19  7:35 PM   Result Value Ref Range    ABO Grouping Only A     Rh Grouping Only POS     Antibody Screen-Cod NEG    ABO AND RH CONFIRMATION    Collection Time: 01/02/19  8:15 PM   Result Value Ref Range    ABO Confirm A     Second Rh Group POS        Imaging:  No orders to display           EKG : personally reviewed by me    Sinus rhythm, lateral ST depressions.    All pertinent features of laboratory and imaging reviewed including primary images where applicable    Assessment / Plan:  1.  Non-ST MI  2.  Anemia  3.  Bladder cancer on chemo  4.  Vomiting/diarrhea  5.  Minimal change disease, advanced kidney disease  6.  Hypertension    Given his advanced kidney disease, anemia with possible GI bleed he is a not a candidate for invasive evaluation.  Recommend medical therapy with baby aspirin, Lipitor, metoprolol.  Obtain an echocardiogram in the morning, troponin elevation likely secondary to demand ischemia as well.  If echocardiogram is normal, would suggest medical therapy.    It is my pleasure to participate in the care of Mr. Dubois.  Please do not hesitate to contact me with questions or concerns.    Jonh Angulo    1/2/2019

## 2019-01-03 NOTE — ASSESSMENT & PLAN NOTE
Transfer to [facility]  Serial troponin I  Follow-up echocardiogram  Nitroglycerin infusion  Aspirin therapy  Morphine for chest pain  No cardiac intervention per cardiology at this time  Not a candidate for anticoagulation with hemoglobin drop of 3 g in the last couple weeks  Transfuse to hemoglobin greater than 8 with ischemia  Reviewed with cardiology    Not a candidate for intervention with multiple cormorbid states and because of family/pt preference  Continue ASA

## 2019-01-03 NOTE — ED NOTES
Med rec updated and complete.  Allergies reviewed.  Pt denies antibiotic use in last 30 days at home.

## 2019-01-03 NOTE — ED NOTES
Assist RN: Consent signed and in chart for blood transfusion, tubing primed and ready. Pt resting with needs met.

## 2019-01-03 NOTE — ED PROVIDER NOTES
ED Provider Note    CHIEF COMPLAINT  Chief Complaint   Patient presents with   • MI (Non ST Segment Elevation MI)   • Anemia       HPI  HPI     78 y.o. M p/w CC of  Generalized fatigue for the past 4 days.  Patient was transferred from outside hospital for cardiology consult and NSTEMI management.  Patient states that he is felt generalized fatigue for the past few days.  He denies chest pain or shortness of breath.  Patient states that he coughed up bloody sputum approximately 2 days ago he also had 2 episodes of diarrhea.  Patient states that he vomited one time last night.     Hx of bladder CA and Last chemo 12/21  Surgeon= blayne      REVIEW OF SYSTEMS  Review of Systems   Constitutional: Negative.  Negative for fever.   HENT: Negative.  Negative for ear pain and sore throat.    Eyes: Negative.  Negative for pain.   Respiratory: Positive for cough. Negative for shortness of breath.    Cardiovascular: Negative.  Negative for chest pain.   Gastrointestinal: Negative.  Negative for abdominal pain and blood in stool.   Genitourinary: Negative for dysuria and flank pain.   Musculoskeletal: Negative for back pain, myalgias and neck pain.   Skin: Negative.  Negative for rash.   Neurological: Negative for focal weakness, seizures, weakness and headaches.   Endo/Heme/Allergies: Does not bruise/bleed easily.   Psychiatric/Behavioral: Negative for hallucinations and suicidal ideas.   All other systems reviewed and are negative.      PAST MEDICAL HISTORY   has a past medical history of Chronic pain; Hematuria; History of prostate cancer; Hypertension; Mixed dyslipidemia; Nicotine dependence; Proteinuria; Renal disorder; Strep throat; and Vitamin D deficiency.    SOCIAL HISTORY  Social History     Social History Main Topics   • Smoking status: Former Smoker     Packs/day: 0.50     Years: 50.00     Types: Cigarettes   • Smokeless tobacco: Never Used   • Alcohol use No   • Drug use: No   • Sexual activity: Not on file        SURGICAL HISTORY   has a past surgical history that includes hernia repair (1965); prostatectomy, radical retro (2000); cataract extraction with iol (01/2011); other; colonoscopy - endo (N/A, 4/6/2016); and gastroscopy-endo (N/A, 4/6/2016).    CURRENT MEDICATIONS  Home Medications     Reviewed by Rafael Brown (Pharmacy Tech) on 01/02/19 at 2103  Med List Status: Complete   Medication Last Dose Status   aspirin EC (ECOTRIN) 81 MG TBEC 1/2/2019 Active   furosemide (LASIX) 40 MG Tab 1/2/2019 Active   lisinopril (PRINIVIL, ZESTRIL) 40 MG tablet 1/2/2019 Active   patiromer (VELTASSA) 16.8 g Pack 12/28/2018 Active   pentosan (ELMIRON) 100 MG Cap 1/2/2019 Active   pramipexole (MIRAPEX) 0.25 MG Tab 1/2/2019 Active   vitamin D (CHOLECALCIFEROL) 1000 UNIT Tab 1/2/2019 Active                ALLERGIES  Allergies   Allergen Reactions   • Statins [Hmg-Coa-R Inhibitors] Unspecified     Severe leg weakness   • Influenza Vaccine Live        PHYSICAL EXAM  VITAL SIGNS: /56   Pulse 75   Temp 37.4 °C (99.3 °F)   Resp 18   Wt 78.9 kg (174 lb)   SpO2 95%   BMI 26.46 kg/m²  @LOLLY[[id number]::@  Pulse ox interpretation: I interpret this pulse ox as normal.    Physical Exam   Constitutional: He is oriented to person, place, and time and well-developed, well-nourished, and in no distress.   HENT:   Head: Normocephalic.   Right Ear: External ear normal.   Left Ear: External ear normal.   Mouth/Throat: No oropharyngeal exudate.   Eyes: Pupils are equal, round, and reactive to light. EOM are normal. No scleral icterus.   Neck: Normal range of motion.   Cardiovascular: Normal rate.    No murmur heard.  Pulmonary/Chest: Effort normal. No respiratory distress.   Abdominal: He exhibits no distension. There is no tenderness.   Musculoskeletal: Normal range of motion. He exhibits no deformity.   Neurological: He is alert and oriented to person, place, and time. Coordination normal.   Skin: Skin is warm and dry. No rash noted. No  erythema.   Psychiatric: Affect and judgment normal.   Nursing note and vitals reviewed.      DIAGNOSTIC STUDIES / PROCEDURES    LABS/EKG  Results for orders placed or performed during the hospital encounter of 19   COD - Adult (Type and Screen)   Result Value Ref Range    ABO Grouping Only A     Rh Grouping Only POS     Antibody Screen-Cod NEG     Component R       R3                  Red Blood Cells3    [id number]   transfused   19   23:07      Product Type Red Blood Cells LR Pheresis     Dispense Status Transfused     Unit Number (Barcoded) [id number]     Product Code (Barcoded) U8773A26     Blood Type (Barcoded) 6200    ABO AND RH CONFIRMATION   Result Value Ref Range    ABO Confirm A     Second Rh Group POS    CBC WITHOUT DIFFERENTIAL   Result Value Ref Range    WBC 18.5 (H) 4.8 - 10.8 K/uL    RBC 2.69 (L) 4.70 - 6.10 M/uL    Hemoglobin 8.3 (L) 14.0 - 18.0 g/dL    Hematocrit 27.1 (L) 42.0 - 52.0 %    .7 (H) 81.4 - 97.8 fL    MCH 30.9 27.0 - 33.0 pg    MCHC 30.6 (L) 33.7 - 35.3 g/dL    RDW 47.2 35.9 - 50.0 fL    Platelet Count 136 (L) 164 - 446 K/uL    MPV 10.9 9.0 - 12.9 fL   EKG   Result Value Ref Range    Report       [facility] Emergency Dept.    Test Date:  2019  Pt Name:    NIURKA HERNANDEZ                   Department: ER  MRN:        [id number]                      Room:       [facility]  Gender:     Male                         Technician: [id number]  :        [id number]                   Requested By:ER TRIAGE PROTOCOL  Order #:    [id number]                    Reading MD:    Measurements  Intervals                                Axis  Rate:       71                           P:          74  AZ:         168                          QRS:        54  QRSD:       92                           T:          106  QT:         380  QTc:        413    Interpretive Statements  SINUS RHYTHM  REPOL ABNRM SUGGESTS ISCHEMIA, DIFFUSE LEADS  No previous ECG available for comparison      12 Lead EKG interpreted by me to show:  Normal sinus rhythm  Rate 71  Axis: Normal  Intervals: Normal  Normal T waves  ST depression in V4-6 and II and aVF  My impression of this EKG: No STEMI., Concerning for ischemia    RADIOLOGY  No orders to display        COURSE & MEDICAL DECISION MAKING  Pertinent Labs & Imaging studies reviewed by me. (See chart for details)    78 y.o. male PMH metastatic prostate cancer presents with a chief complaint of global weakness.     Differential diagnosis includes but is not limited to:  #elevated trop and #ST depression  Cardiologist does not feel this is consistent with STEMI  7:35  Cardiology paged and MD will come to ED to see pt  Will monitor on tele and trend trop as inpt  BNP elevated as well 1150  Would consider CTPE testing as inpt    #decreased HB  Unclear etiology   May be 2/2 to GIB  Given 1U PRBC  And protonix  May be 2/2 recent chemo  Will continue to trend as inpt      The total critical care time on this patient is 40 minutes, resuscitating patient, speaking with admitting physician, and deciphering test results. This 40 minutes is exclusive of separately billable procedures.     FINAL IMPRESSION  Visit Diagnoses     ICD-10-CM   1. NSTEMI (non-ST elevated myocardial infarction) (HCC) I21.4   2. Anemia, unspecified type D64.9              Electronically signed by: Warren Al, 1/2/2019 7:12 PM

## 2019-01-03 NOTE — ASSESSMENT & PLAN NOTE
Known COPD  Ongoing tobacco abuse  RT protocols  If nebulizer trial successful consider IV steroids  Suspect cardiac asthma and CHF from myocardial infarction more likely  Underlying chronic lung disease clearly contributing though to respiratory failure, suspect significant hypercarbia  Trial AVAPS auto BiPAP

## 2019-01-03 NOTE — ASSESSMENT & PLAN NOTE
Patient is having lower extremity edema 1+ pitting to shins. He also had a high BNP on admission. CXR showed possible mild CHF. He is currently having an NSTEMI which could be contributing to elevated BNP. He also has an ROSANNE on CKD which could be contributing to elevation. He is having shortness of breath and it is unsure whether this is coming from lungs or abdominal distention.     Plan:  - Day team to consider diuretics given history of CKD and mild CHF and hypotension on admission.   - Echo pending to evaluate.

## 2019-01-04 NOTE — ASSESSMENT & PLAN NOTE
Known h/o COPD.  Ongoing tobacco use.   RT protocol.  Comfort care.  Morphine (see orders) for pain and air hunger.

## 2019-01-04 NOTE — ASSESSMENT & PLAN NOTE
Likely secondary to pulmonary edema (elevated BNP, CXR consistent with pulmonary edema) in setting of COPD.  DNR/DNI per patient and wife.  Was placed on BiPAP prior to comfort care measures.   Morphine (see orders) for pain and air hunger.

## 2019-01-04 NOTE — DISCHARGE SUMMARY
Internal Medicine Death Summary    Admit Date:  1/2/2019       Date of Death:   1/3/2019    Service:   [facility] Internal Medicine [facility] Team  Attending Physician(s):   Dr. Gabriel Gaffney, Dr. Altaf Hamilton  Senior Resident(s):   Dr. Linda Bowles, Dr. Holley Malik      Cause of Death:   NSTEMI    Diagnoses:            Principal Problem:    NSTEMI (non-ST elevated myocardial infarction) (HCC) POA: Yes  Active Problems:    Abdominal distention POA: Yes    Acute respiratory failure with hypoxia (HCC) POA: Yes    Acute pulmonary edema (HCC) POA: Yes    HTN (hypertension) POA: Yes    Acute kidney injury superimposed on chronic kidney disease (HCC) POA: Unknown    Anemia POA: Yes    Chronic obstructive pulmonary disease with acute exacerbation (HCC) POA: Unknown    Hyperkalemia POA: Unknown    Elevated liver enzymes POA: Unknown  Resolved Problems:    * No resolved hospital problems. *      Hospital Summary (Brief Narrative):       78M with extensive PMHx including prostate cancer with recent diagnosis of metastasis, CKD, CHF presented 1/2/2019 with generalized fatigue and chest tightness to an outside hospital and was transferred to [facility] for Cardiology consultation.  Diagnosed with NSTEMI, seen by Cardiology who felt patient not a candidate for surgical intervention given multiple comorbidities (including underlying malignancy, possible GI bleed, advanced age), and was admitted to Telemetry.  Patient developed worsening EKG changes and increased shortness of breath, became lethargic, was transferred to cardiac ICU.  Team spoke with patient's wife at great length.  Patient did have an advanced directive, and did not want to be intubated/mechanically ventilated.  Patient's CXR indicated worsening pulmonary edema and on exam he had diffuse wheezing and bibasilar rales, consistent with likely decompensation of CHF.  Started on nitroglycerin drip for chest pain.  Patient was also given morphine, nebulizer  treatments, and placed on BiPAP for his difficulty breathing.  Patient's hemoglobin was also found to be trending down, down 3 g from 2018; transfused 1 unit of RBC.  No evidence of bleeding clinically.  Patient was lethargic and intermittently confused, but easily arousable.  Patient's CHF was initially treated with IV Lasix 20 mg, which resulted in minimal output.  An order of IV Lasix 80 mg was placed.  However, after extensive discussion with patient's wife, patient was placed on comfort care with comfort care measures placed.  Patient ultimately  on 1/3/2018 at 1746.        Patient /Hospital Summary (Details -- Problem Oriented) :          Acute respiratory failure with hypoxia (HCC)   Assessment & Plan    Likely secondary to pulmonary edema (elevated BNP, CXR consistent with pulmonary edema) in setting of COPD.  DNR/DNI per patient and wife.  Was placed on BiPAP prior to comfort care measures.   Morphine (see orders) for pain and air hunger.       Abdominal distention   Assessment & Plan    Patient states that his abdomen is usually big but in recent days it has been larger. His albumin is low at 3.1 Patient also states that his chemotherapy is given via a needle into his abdomen for 7 days. It's unclear whether this is peritoneal chemotherapy.   - Abd ultrasound revealing for trace ascites.  No abd pain/tenderness/rebound/guarding.    - Comfort measures.  Zosyn that was started in ED discontinued.       * NSTEMI (non-ST elevated myocardial infarction) ([facility])   Assessment & Plan    Patient Is having ST depressions on EKG in V4, V4, V6 that worsened on repeat EKG hours later. This is likely demand ischemia given that patient has dropped about 2 units over the last week.     Plan:  - Transfused 1U PRBC  - Aspirin, statin, metoprolol per Cardiology.  Metoprolol held secondary to hypotension.  - NTG infusion.  Morphine for chest pain.   - No cardiac intervention at this time per Cardiology.  Not  candidate secondary to comorbid states and patient/family preference in goals of care.   - Not candidate for anticoagulation given Hgb drop 3 g in last two weeks.   - Transfuse if Hg < 8 given ischemia.      Chronic obstructive pulmonary disease with acute exacerbation (HCC)   Assessment & Plan    Known h/o COPD.  Ongoing tobacco use.   RT protocol.  Comfort care.  Morphine (see orders) for pain and air hunger.      Anemia   Assessment & Plan    3 g decrease in Hgb since 12/21/2018.    No obvious evidence of GI bleed, though stool heme occult positive and thus GI bleed likely cause.   Comfort care.       Acute kidney injury superimposed on chronic kidney disease (HCC)   Assessment & Plan    Patient's creatinine has increased to 4.55 from a baseline of around 2.9-3. The patient stated that he had one kidney taken out due to metastatic malignancy. ROSANNE is likely secondary to hypoperfusion given recent NSTEMI.   - Gently hydrated with IVF fluids 50cc/hr prior to change to comfort care measures.        HTN (hypertension)   Assessment & Plan    Patient has history of HTN. Given recent hypotension, holding hypertensive medications.      Elevated liver enzymes   Assessment & Plan    AST is elevated at 54 and Alk Phos is elevated at 118. May be from demand ischemia/ recent chemotherapy. Unsure if Alk Phos is due to metastatic disease. Patient stated that the cancer is currently only in his bladder.   - Secondary to above.   - Comfort care.     Hyperkalemia   Assessment & Plan    Likely secondary to kidney disease. Potassium baseline appears to be around 5.4, which is what his current labs show as well. However, given recent EKG changes and that he was transfused 1U PRBC, there is a probability that it will increase further.   - No response to Lasix 20 mg IV.  IV Lasix 80 mg ordered, but ultimately not given as patient changed to comfort care.          Consultants:     [facility]  Cardiology    Imaging/ Testing:      US-ABDOMEN  COMPLETE SURVEY   Final Result         1. Increased hepatic echogenicity, suggestive of steatosis or underlying hepatocellular disease.   2. Trace ascites.   3. Eccentric thickening of the gallbladder wall, likely related to underlying liver disease. No gallstones or pericholecystic fluid.   4. Reported left nephrectomy. No mass lesions detected in the region of the left renal fossa.      DX-CHEST-LIMITED (1 VIEW)   Final Result      Interstitial opacification is worrisome for edema. Underlying scarring is also possible

## 2019-01-04 NOTE — ASSESSMENT & PLAN NOTE
3 g decrease in Hgb since 12/21/2018.    No obvious evidence of GI bleed, though stool heme occult positive and thus GI bleed likely cause.   Comfort care.

## 2019-01-05 LAB
EST. AVERAGE GLUCOSE BLD GHB EST-MCNC: 146 MG/DL
HBA1C MFR BLD: 6.7 % (ref 0–5.6)

## 2019-01-08 LAB
BACTERIA BLD CULT: NORMAL
BACTERIA BLD CULT: NORMAL
SIGNIFICANT IND 70042: NORMAL
SIGNIFICANT IND 70042: NORMAL
SITE SITE: NORMAL
SITE SITE: NORMAL
SOURCE SOURCE: NORMAL
SOURCE SOURCE: NORMAL